# Patient Record
Sex: MALE | Race: BLACK OR AFRICAN AMERICAN
[De-identification: names, ages, dates, MRNs, and addresses within clinical notes are randomized per-mention and may not be internally consistent; named-entity substitution may affect disease eponyms.]

---

## 2019-11-09 ENCOUNTER — HOSPITAL ENCOUNTER (INPATIENT)
Dept: HOSPITAL 61 - ER | Age: 44
LOS: 4 days | Discharge: HOME | DRG: 385 | End: 2019-11-13
Attending: FAMILY MEDICINE | Admitting: FAMILY MEDICINE
Payer: SELF-PAY

## 2019-11-09 VITALS — HEIGHT: 63 IN | BODY MASS INDEX: 22.15 KG/M2 | WEIGHT: 125 LBS

## 2019-11-09 VITALS — DIASTOLIC BLOOD PRESSURE: 72 MMHG | SYSTOLIC BLOOD PRESSURE: 117 MMHG

## 2019-11-09 VITALS — DIASTOLIC BLOOD PRESSURE: 69 MMHG | SYSTOLIC BLOOD PRESSURE: 106 MMHG

## 2019-11-09 VITALS — DIASTOLIC BLOOD PRESSURE: 82 MMHG | SYSTOLIC BLOOD PRESSURE: 124 MMHG

## 2019-11-09 VITALS — SYSTOLIC BLOOD PRESSURE: 109 MMHG | DIASTOLIC BLOOD PRESSURE: 69 MMHG

## 2019-11-09 DIAGNOSIS — E43: ICD-10-CM

## 2019-11-09 DIAGNOSIS — Z82.49: ICD-10-CM

## 2019-11-09 DIAGNOSIS — D62: ICD-10-CM

## 2019-11-09 DIAGNOSIS — K51.911: Primary | ICD-10-CM

## 2019-11-09 DIAGNOSIS — Z79.899: ICD-10-CM

## 2019-11-09 LAB
ALBUMIN SERPL-MCNC: 2.3 G/DL (ref 3.4–5)
ALBUMIN/GLOB SERPL: 0.3 {RATIO} (ref 1–1.7)
ALP SERPL-CCNC: 70 U/L (ref 46–116)
ALT SERPL-CCNC: < 6 U/L (ref 16–63)
ANION GAP SERPL CALC-SCNC: 16 MMOL/L (ref 6–14)
AST SERPL-CCNC: 14 U/L (ref 15–37)
BASOPHILS # BLD AUTO: 0.1 X10^3/UL (ref 0–0.2)
BASOPHILS NFR BLD: 1 % (ref 0–3)
BILIRUB SERPL-MCNC: 0.7 MG/DL (ref 0.2–1)
BUN SERPL-MCNC: 14 MG/DL (ref 8–26)
BUN/CREAT SERPL: 18 (ref 6–20)
CALCIUM SERPL-MCNC: 8.7 MG/DL (ref 8.5–10.1)
CHLORIDE SERPL-SCNC: 95 MMOL/L (ref 98–107)
CO2 SERPL-SCNC: 24 MMOL/L (ref 21–32)
CREAT SERPL-MCNC: 0.8 MG/DL (ref 0.7–1.3)
EOSINOPHIL NFR BLD: 0 % (ref 0–3)
EOSINOPHIL NFR BLD: 0 X10^3/UL (ref 0–0.7)
ERYTHROCYTE [DISTWIDTH] IN BLOOD BY AUTOMATED COUNT: 13.6 % (ref 11.5–14.5)
FECAL OB PT: POSITIVE
GFR SERPLBLD BASED ON 1.73 SQ M-ARVRAT: 127.1 ML/MIN
GLOBULIN SER-MCNC: 7.3 G/DL (ref 2.2–3.8)
GLUCOSE SERPL-MCNC: 106 MG/DL (ref 70–99)
HCT VFR BLD CALC: 30 % (ref 39–53)
HGB BLD-MCNC: 9.7 G/DL (ref 13–17.5)
LIPASE: 111 U/L (ref 73–393)
LYMPHOCYTES # BLD: 1.4 X10^3/UL (ref 1–4.8)
LYMPHOCYTES NFR BLD AUTO: 11 % (ref 24–48)
MCH RBC QN AUTO: 27 PG (ref 25–35)
MCHC RBC AUTO-ENTMCNC: 32 G/DL (ref 31–37)
MCV RBC AUTO: 83 FL (ref 79–100)
MONO #: 1.4 X10^3/UL (ref 0–1.1)
MONOCYTES NFR BLD: 11 % (ref 0–9)
NEUT #: 10.5 X10^3/UL (ref 1.8–7.7)
NEUTROPHILS NFR BLD AUTO: 78 % (ref 31–73)
PLATELET # BLD AUTO: 508 X10^3/UL (ref 140–400)
POTASSIUM SERPL-SCNC: 3.5 MMOL/L (ref 3.5–5.1)
PROT SERPL-MCNC: 9.6 G/DL (ref 6.4–8.2)
RBC # BLD AUTO: 3.63 X10^6/UL (ref 4.3–5.7)
SODIUM SERPL-SCNC: 135 MMOL/L (ref 136–145)
WBC # BLD AUTO: 13.4 X10^3/UL (ref 4–11)

## 2019-11-09 PROCEDURE — P9016 RBC LEUKOCYTES REDUCED: HCPCS

## 2019-11-09 PROCEDURE — 85610 PROTHROMBIN TIME: CPT

## 2019-11-09 PROCEDURE — 85027 COMPLETE CBC AUTOMATED: CPT

## 2019-11-09 PROCEDURE — 86140 C-REACTIVE PROTEIN: CPT

## 2019-11-09 PROCEDURE — 82274 ASSAY TEST FOR BLOOD FECAL: CPT

## 2019-11-09 PROCEDURE — 74177 CT ABD & PELVIS W/CONTRAST: CPT

## 2019-11-09 PROCEDURE — 83690 ASSAY OF LIPASE: CPT

## 2019-11-09 PROCEDURE — 36430 TRANSFUSION BLD/BLD COMPNT: CPT

## 2019-11-09 PROCEDURE — G0378 HOSPITAL OBSERVATION PER HR: HCPCS

## 2019-11-09 PROCEDURE — 86850 RBC ANTIBODY SCREEN: CPT

## 2019-11-09 PROCEDURE — 86920 COMPATIBILITY TEST SPIN: CPT

## 2019-11-09 PROCEDURE — 83605 ASSAY OF LACTIC ACID: CPT

## 2019-11-09 PROCEDURE — 36415 COLL VENOUS BLD VENIPUNCTURE: CPT

## 2019-11-09 PROCEDURE — 96365 THER/PROPH/DIAG IV INF INIT: CPT

## 2019-11-09 PROCEDURE — 45380 COLONOSCOPY AND BIOPSY: CPT

## 2019-11-09 PROCEDURE — 87040 BLOOD CULTURE FOR BACTERIA: CPT

## 2019-11-09 PROCEDURE — 86901 BLOOD TYPING SEROLOGIC RH(D): CPT

## 2019-11-09 PROCEDURE — 87086 URINE CULTURE/COLONY COUNT: CPT

## 2019-11-09 PROCEDURE — 86900 BLOOD TYPING SEROLOGIC ABO: CPT

## 2019-11-09 PROCEDURE — 80053 COMPREHEN METABOLIC PANEL: CPT

## 2019-11-09 PROCEDURE — 87045 FECES CULTURE AEROBIC BACT: CPT

## 2019-11-09 PROCEDURE — 96361 HYDRATE IV INFUSION ADD-ON: CPT

## 2019-11-09 PROCEDURE — 88305 TISSUE EXAM BY PATHOLOGIST: CPT

## 2019-11-09 PROCEDURE — C9113 INJ PANTOPRAZOLE SODIUM, VIA: HCPCS

## 2019-11-09 PROCEDURE — 85025 COMPLETE CBC W/AUTO DIFF WBC: CPT

## 2019-11-09 RX ADMIN — CIPROFLOXACIN SCH MLS/HR: 2 INJECTION, SOLUTION INTRAVENOUS at 20:59

## 2019-11-09 RX ADMIN — BACITRACIN SCH MLS/HR: 5000 INJECTION, POWDER, FOR SOLUTION INTRAMUSCULAR at 16:43

## 2019-11-09 NOTE — RAD
CT ABD PELV W/ IV CONTRST ONLY 

 

Indication: Left-sided abdominal pain, tenesmus

 

Technique: Postcontrast CT imaging was performed of the abdomen pelvis, 

multiplanar reconstruction images submitted. One or more of the following 

individualized dose reduction techniques were utilized for this 

examination:  1. Automated exposure control  2. Adjustment of the mA 

and/or kV according to patient size  3. Use of iterative reconstruction 

technique.

 

Comparison: None

 

Findings:  

There is severe long segment colonic wall thickening greatest of the 

descending colon to the level of the rectum, to a lesser degree of the 

transverse colon and near hepatic flexure. Cecum walls do not appear 

significantly thickened. Bowel is not considered significantly dilated. No

free air is identified. There is no localized extraluminal fluid 

collection. Appendix is not clearly identified.

 

Gallbladder is present without obvious intraluminal abnormality by CT. 

Both kidneys enhance, no hydronephrosis. There is no adrenal nodularity. 

No focal abnormality is identified of the liver, pancreas, spleen. There 

is no pleural fluid.

 

IMPRESSION:

1.  There is long segment prominent colonic wall thickening, evidence of 

colitis, extent to the rectum as could be seen with sequela of ulcerative 

colitis.

 

Electronically signed by: Serg Balderas MD (11/9/2019 9:04 AM) [facility]

## 2019-11-09 NOTE — PHYS DOC
Adult General


Chief Complaint


Chief Complaint:  ABDOMINAL PAIN





HPI


HPI





Patient is a 44  year old male who presents with chief complaint of abdominal 

discomfort patient states that he feels a bubbling sensation in his abdomen he 

feels like he needs to have frequent bowel movements he goes to the bathroom he 

just has a small amount of brown stool with basically mostly mucus occasional 

blood mixed in he tells me that 10 years ago he had a history of colitis and he 

had a flareup he thinks he is having a flareup now no fever that he knows of no 

vomiting mild nausea and no past medical history does not smoke drink or use 

drugs review of systems otherwise negative except as noted in the history of 

present illness negative for chest pain shortness of breath or cough.





Review of Systems


Review of Systems





Constitutional: Denies fever or chills []


Eyes: Denies change in visual acuity, redness, or eye pain []


HENT: Denies nasal congestion or sore throat []


Respiratory: Denies cough or shortness of breath []


Cardiovascular: No additional information not addressed in HPI []





Integument: Denies rash or skin lesions []


Neurologic: Denies headache, focal weakness or sensory changes []


Endocrine: Denies polyuria or polydipsia []





All other systems were reviewed and found to be within normal limits, except as 

documented in this note.





Current Medications


Current Medications





Current Medications








 Medications


  (Trade)  Dose


 Ordered  Sig/Arely  Start Time


 Stop Time Status Last Admin


Dose Admin


 


 Ciprofloxacin/


 Dextrose  200 ml @ 


 200 mls/hr  1X  ONCE  11/9/19 09:45


 11/9/19 10:44   





 


 Info


  (CONTRAST GIVEN


 -- Rx MONITORING)  1 each  PRN DAILY  PRN  11/9/19 08:45


 11/11/19 08:44   





 


 Iohexol


  (Omnipaque 300


 Mg/ml)  75 ml  1X  ONCE  11/9/19 08:45


 11/9/19 08:46 DC 11/9/19 08:59


75 ML


 


 Metronidazole  100 ml @ 


 100 mls/hr  1X  ONCE  11/9/19 09:45


 11/9/19 10:44   





 


 Morphine Sulfate


  (Morphine


 Sulfate)  2 mg  PRN Q2HR  PRN  11/9/19 09:45


 11/10/19 09:44   





 


 Ondansetron HCl


  (Zofran)  4 mg  PRN Q8HRS  PRN  11/9/19 09:45


 11/10/19 09:44   





 


 Pantoprazole


 Sodium


  (PROTONIX VIAL


 for IV PUSH)  40 mg  1X  ONCE  11/9/19 09:45


 11/9/19 09:46 DC  





 


 Sodium Chloride  1,000 ml @ 


 75 mls/hr  [id number]  11/9/19 09:31


 11/10/19 09:30   














Allergies


Allergies





Allergies








Coded Allergies Type Severity Reaction Last Updated Verified


 


  No Known Drug Allergies    11/9/19 No











Physical Exam


Physical Exam





Constitutional: Well developed, well nourished, no acute distress, non-toxic 

appearance. []


HENT: Normocephalic, atraumatic, bilateral external ears normal, oropharynx 

moist, no oral exudates, nose normal. []


Eyes: PERRLA, EOMI, conjunctiva normal, no discharge. [] 


Neck: Normal range of motion, no tenderness, supple, no stridor. [] 


Pulmonary: Normal respiratory effort no increased work of breathing no obvious 

chest wall trauma


Abdomen: Bowel sounds normal, soft, MILD LLQ tenderness, no masses, no pulsatile

 masses. [] 


RECTAL EXAM TTP NOTED MELENA NOTED


Skin: CHRONIC SKIN CHANGES NTOED


Back: No tenderness, no CVA tenderness. [] 


Extremities: No tenderness, no cyanosis, no clubbing, ROM intact, no edema. [] 


Neurologic: Alert and oriented X 3, normal motor function, normal sensory 

function, no focal deficits noted. []


Psychologic: Affect normal, judgement normal, mood normal. []





Current Patient Data


Vital Signs





                                   Vital Signs








  Date Time  Temp Pulse Resp B/P (MAP) Pulse Ox O2 Delivery O2 Flow Rate FiO2


 


11/9/19 07:44 98.8 116 18 135/73 (93) 100 Room Air  





 98.8       








Lab Values





                                Laboratory Tests








Test


 11/9/19


07:52 11/9/19


09:00


 


White Blood Count


 13.4 x10^3/uL


(4.0-11.0)  H 





 


Red Blood Count


 3.63 x10^6/uL


(4.30-5.70)  L 





 


Hemoglobin


 9.7 g/dL


(13.0-17.5)  L 





 


Hematocrit


 30.0 %


(39.0-53.0)  L 





 


Mean Corpuscular Volume


 83 fL ()


 





 


Mean Corpuscular Hemoglobin 27 pg (25-35)   


 


Mean Corpuscular Hemoglobin


Concent 32 g/dL


(31-37) 





 


Red Cell Distribution Width


 13.6 %


(11.5-14.5) 





 


Platelet Count


 508 x10^3/uL


(140-400)  H 





 


Neutrophils (%) (Auto) 78 % (31-73)  H 


 


Lymphocytes (%) (Auto) 11 % (24-48)  L 


 


Monocytes (%) (Auto) 11 % (0-9)  H 


 


Eosinophils (%) (Auto) 0 % (0-3)   


 


Basophils (%) (Auto) 1 % (0-3)   


 


Neutrophils # (Auto)


 10.5 x10^3/uL


(1.8-7.7)  H 





 


Lymphocytes # (Auto)


 1.4 x10^3/uL


(1.0-4.8) 





 


Monocytes # (Auto)


 1.4 x10^3/uL


(0.0-1.1)  H 





 


Eosinophils # (Auto)


 0.0 x10^3/uL


(0.0-0.7) 





 


Basophils # (Auto)


 0.1 x10^3/uL


(0.0-0.2) 





 


Sodium Level


 135 mmol/L


(136-145)  L 





 


Potassium Level


 3.5 mmol/L


(3.5-5.1) 





 


Chloride Level


 95 mmol/L


()  L 





 


Carbon Dioxide Level


 24 mmol/L


(21-32) 





 


Anion Gap 16 (6-14)  H 


 


Blood Urea Nitrogen


 14 mg/dL


(8-26) 





 


Creatinine


 0.8 mg/dL


(0.7-1.3) 





 


Estimated GFR


(Cockcroft-Gault) 127.1  


 





 


BUN/Creatinine Ratio 18 (6-20)   


 


Glucose Level


 106 mg/dL


(70-99)  H 





 


Calcium Level


 8.7 mg/dL


(8.5-10.1) 





 


Total Bilirubin


 0.7 mg/dL


(0.2-1.0) 





 


Aspartate Amino Transferase


(AST) 14 U/L (15-37)


L 





 


Alanine Aminotransferase (ALT)


 < 6 U/L


(16-63)  L 





 


Alkaline Phosphatase


 70 U/L


() 





 


Total Protein


 9.6 g/dL


(6.4-8.2)  H 





 


Albumin


 2.3 g/dL


(3.4-5.0)  L 





 


Albumin/Globulin Ratio


 0.3 (1.0-1.7)


L 





 


Lipase


 111 U/L


() 





 


Stool Occult Blood


 


 Positive (NEG)








                                Laboratory Tests


11/9/19 07:52








                                Laboratory Tests


11/9/19 07:52











EKG


EKG


[]





Radiology/Procedures


Radiology/Procedures


[]


Impressions:


 


IMPRESSION:


1.  There is long segment prominent colonic wall thickening, evidence of 


colitis, extent to the rectum as could be seen with sequela of ulcerative 


colitis.


 


Electronically signed by: Serg Balderas MD (11/9/2019 9:04 AM) [facility]














Course & Med Decision Making


Course & Med Decision Making


Pertinent Labs and Imaging studies reviewed. (See chart for details)





[]COLITIS


WBC 13


HB 9 NO OLD


SUSPECT UNDIAGNOSED COLITIS





CIPRO/FLAGYL


LACTIC PENDING





D/W FULBRIGHT





HB 9 HD STABLE MILD TACHYCARDIA, DID TYPE AND SCREEN, HYDRATED IN ER, PROTONIX 

BOLUS





Dragon Disclaimer


Dragon Disclaimer


This electronic medical record was generated, in whole or in part, using a voice

 recognition dictation system.





Departure


Departure


Impression:  


   Primary Impression:  


   Colitis


Disposition:  09 ADMITTED AS INPATIENT


Admitting Physician:  HIMS


Condition:  STABLE











DORCAS AVILES MD             Nov 9, 2019 07:40

## 2019-11-09 NOTE — PDOC1
History and Physical


Date of Admission


Date of Admission


DATE: 19 


TIME: 09:27





Identification/Chief Complaint


Chief Complaint


SEEN IN ER , 44  year old male who presents with chief complaint of abdominal 

discomfort patient states that he feels a bubbling sensation in his abdomen he 

feels like he needs to have frequent bowel movements he goes to the bathroom he 

just has a small amount of brown stool with mucus occasional blood mixed in 





10 years ago he had a history of colitis 





Denies prior colonoscopy or sigmoidoscopy.





Past Medical History


Cardiovascular:  No pertinent hx


GI:  Inflam bowel disease


Infectious disease:  No pertinent hx





Family History


Family History:  Hypertension





Social History


Smoke:  No


ALCOHOL:  none


Drugs:  None





Current Medications


Current Medications





Current Medications


Sodium Chloride 1,000 ml @  1,000 mls/hr 1X  ONCE IV  Last administered on 

19at 07:53;  Start 19 at 07:45;  Stop 19 at 08:44;  Status DC


Iohexol (Omnipaque 300 Mg/ml) 75 ml 1X  ONCE IV  Last administered on 19at 

08:59;  Start 19 at 08:45;  Stop 19 at 08:46;  Status DC


Info (CONTRAST GIVEN -- Rx MONITORING) 1 each PRN DAILY  PRN MC SEE COMMENTS;  

Start 19 at 08:45;  Stop 19 at 08:44





Allergies


Allergies:  


Coded Allergies:  


     No Known Drug Allergies (Unverified , 19)





ROS


Review of System





Review of Systems


Review of Systems





Constitutional: POS  fever // chills []


Eyes: Denies change in visual acuity, redness, or eye pain []


HENT: Denies nasal congestion or sore throat []


Respiratory: Denies cough or shortness of breath []


Cardiovascular: No additional information not addressed in HPI []


Integument: Denies rash or skin lesions []


Neurologic: Denies headache, focal weakness or sensory changes []


Endocrine: Denies polyuria or polydipsia []





14 PT  systems were reviewed and found to be within normal limits, except as 

documented





Physical Exam


Physical Exam





Physical Exam


Physical Exam





Constitutional: Well developed, well nourished, no acute distress, non-toxic 

appearance. []


HENT: Normocephalic, atraumatic, bilateral external ears normal, oropharynx 

moist, no oral exudates, nose normal. []


Eyes: PERRLA, EOMI, conjunctiva normal, no discharge. [] 


Neck: Normal range of motion, no tenderness, supple, no stridor. [] 


Pulmonary: Normal respiratory effort no increased work of breathing no obvious 

chest wall trauma


Abdomen: Bowel sounds normal, soft, MILD LLQ tenderness, no masses, no pulsatile

 masses. [] 


RECTAL EXAM TTP NOTED MELENA NOTED BY ER PHYSICIAN 


Skin: CHRONIC SKIN CHANGES NTOED


Back: No tenderness, no CVA tenderness. [] 


Extremities: No tenderness, no cyanosis, no clubbing, ROM intact, no edema. [] 


Neurologic: Alert and oriented X 3, normal motor function, normal sensory 

function, no focal deficits noted. []


Psychologic: Affect normal, judgement normal, mood normal. []


General:  Alert, Oriented X3, Cooperative, No acute distress


HEENT:  Atraumatic, EOMI, Mucous membr. moist/pink


Lungs:  Clear to auscultation, Normal air movement


Heart:  RRR, no thrills


Abdomen:  Soft


Extremities:  No cyanosis


Neuro:  Normal speech, Sensation intact, Cranial nerves 3-12 NL


Psych/Mental Status:  Mental status NL, Mood NL





Vitals


Vitals





Vital Signs








  Date Time  Temp Pulse Resp B/P (MAP) Pulse Ox O2 Delivery O2 Flow Rate FiO2


 


19 07:44 98.8 116 18 135/73 (93) 100 Room Air  





 98.8       











Labs


Labs





Laboratory Tests








Test


 19


07:52 19


09:00


 


White Blood Count


 13.4 x10^3/uL


(4.0-11.0) 





 


Red Blood Count


 3.63 x10^6/uL


(4.30-5.70) 





 


Hemoglobin


 9.7 g/dL


(13.0-17.5) 





 


Hematocrit


 30.0 %


(39.0-53.0) 





 


Mean Corpuscular Volume 83 fL ()  


 


Mean Corpuscular Hemoglobin 27 pg (25-35)  


 


Mean Corpuscular Hemoglobin


Concent 32 g/dL


(31-37) 





 


Red Cell Distribution Width


 13.6 %


(11.5-14.5) 





 


Platelet Count


 508 x10^3/uL


(140-400) 





 


Neutrophils (%) (Auto) 78 % (31-73)  


 


Lymphocytes (%) (Auto) 11 % (24-48)  


 


Monocytes (%) (Auto) 11 % (0-9)  


 


Eosinophils (%) (Auto) 0 % (0-3)  


 


Basophils (%) (Auto) 1 % (0-3)  


 


Neutrophils # (Auto)


 10.5 x10^3/uL


(1.8-7.7) 





 


Lymphocytes # (Auto)


 1.4 x10^3/uL


(1.0-4.8) 





 


Monocytes # (Auto)


 1.4 x10^3/uL


(0.0-1.1) 





 


Eosinophils # (Auto)


 0.0 x10^3/uL


(0.0-0.7) 





 


Basophils # (Auto)


 0.1 x10^3/uL


(0.0-0.2) 





 


Sodium Level


 135 mmol/L


(136-145) 





 


Potassium Level


 3.5 mmol/L


(3.5-5.1) 





 


Chloride Level


 95 mmol/L


() 





 


Carbon Dioxide Level


 24 mmol/L


(21-32) 





 


Anion Gap 16 (6-14)  


 


Blood Urea Nitrogen


 14 mg/dL


(8-26) 





 


Creatinine


 0.8 mg/dL


(0.7-1.3) 





 


Estimated GFR


(Cockcroft-Gault) 127.1 


 





 


BUN/Creatinine Ratio 18 (6-20)  


 


Glucose Level


 106 mg/dL


(70-99) 





 


Calcium Level


 8.7 mg/dL


(8.5-10.1) 





 


Total Bilirubin


 0.7 mg/dL


(0.2-1.0) 





 


Aspartate Amino Transf


(AST/SGOT) 14 U/L (15-37) 


 





 


Alanine Aminotransferase


(ALT/SGPT) < 6 U/L


(16-63) 





 


Alkaline Phosphatase


 70 U/L


() 





 


Total Protein


 9.6 g/dL


(6.4-8.2) 





 


Albumin


 2.3 g/dL


(3.4-5.0) 





 


Albumin/Globulin Ratio 0.3 (1.0-1.7)  


 


Lipase


 111 U/L


() 





 


Stool Occult Blood  Positive (NEG) 








Laboratory Tests








Test


 19


07:52 19


09:00


 


White Blood Count


 13.4 x10^3/uL


(4.0-11.0) 





 


Red Blood Count


 3.63 x10^6/uL


(4.30-5.70) 





 


Hemoglobin


 9.7 g/dL


(13.0-17.5) 





 


Hematocrit


 30.0 %


(39.0-53.0) 





 


Mean Corpuscular Volume 83 fL ()  


 


Mean Corpuscular Hemoglobin 27 pg (25-35)  


 


Mean Corpuscular Hemoglobin


Concent 32 g/dL


(31-37) 





 


Red Cell Distribution Width


 13.6 %


(11.5-14.5) 





 


Platelet Count


 508 x10^3/uL


(140-400) 





 


Neutrophils (%) (Auto) 78 % (31-73)  


 


Lymphocytes (%) (Auto) 11 % (24-48)  


 


Monocytes (%) (Auto) 11 % (0-9)  


 


Eosinophils (%) (Auto) 0 % (0-3)  


 


Basophils (%) (Auto) 1 % (0-3)  


 


Neutrophils # (Auto)


 10.5 x10^3/uL


(1.8-7.7) 





 


Lymphocytes # (Auto)


 1.4 x10^3/uL


(1.0-4.8) 





 


Monocytes # (Auto)


 1.4 x10^3/uL


(0.0-1.1) 





 


Eosinophils # (Auto)


 0.0 x10^3/uL


(0.0-0.7) 





 


Basophils # (Auto)


 0.1 x10^3/uL


(0.0-0.2) 





 


Sodium Level


 135 mmol/L


(136-145) 





 


Potassium Level


 3.5 mmol/L


(3.5-5.1) 





 


Chloride Level


 95 mmol/L


() 





 


Carbon Dioxide Level


 24 mmol/L


(21-32) 





 


Anion Gap 16 (6-14)  


 


Blood Urea Nitrogen


 14 mg/dL


(8-26) 





 


Creatinine


 0.8 mg/dL


(0.7-1.3) 





 


Estimated GFR


(Cockcroft-Gault) 127.1 


 





 


BUN/Creatinine Ratio 18 (6-20)  


 


Glucose Level


 106 mg/dL


(70-99) 





 


Calcium Level


 8.7 mg/dL


(8.5-10.1) 





 


Total Bilirubin


 0.7 mg/dL


(0.2-1.0) 





 


Aspartate Amino Transf


(AST/SGOT) 14 U/L (15-37) 


 





 


Alanine Aminotransferase


(ALT/SGPT) < 6 U/L


(16-63) 





 


Alkaline Phosphatase


 70 U/L


() 





 


Total Protein


 9.6 g/dL


(6.4-8.2) 





 


Albumin


 2.3 g/dL


(3.4-5.0) 





 


Albumin/Globulin Ratio 0.3 (1.0-1.7)  


 


Lipase


 111 U/L


() 





 


Stool Occult Blood  Positive (NEG) 











Images


Images





PATIENT: JONAS PULLIAM   ACCOUNT: [id number]     MRN#: [id number]


: 1975           LOCATION: ER              AGE: 44


SEX: M                    EXAM DT: 19         ACCESSION#: [id number]


STATUS: REG ER            ORD. PHYSICIAN: DORCAS AVILES MD


REASON: left side abdo pain, tenesmus, eval for diverticulitis.


PROCEDURE: CT ABD PELV W/ IV CONTRST ONLY





CT ABD PELV W/ IV CONTRST ONLY 


 


Indication: Left-sided abdominal pain, tenesmus


 


Technique: Postcontrast CT imaging was performed of the abdomen pelvis, 


multiplanar reconstruction images submitted. One or more of the following 


individualized dose reduction techniques were utilized for this 


examination:  1. Automated exposure control  2. Adjustment of the mA 


and/or kV according to patient size  3. Use of iterative reconstruction 


technique.


 


Comparison: None


 


Findings:  


There is severe long segment colonic wall thickening greatest of the 


descending colon to the level of the rectum, to a lesser degree of the 


transverse colon and near hepatic flexure. Cecum walls do not appear 


significantly thickened. Bowel is not considered significantly dilated. No


free air is identified. There is no localized extraluminal fluid 


collection. Appendix is not clearly identified.


 


Gallbladder is present without obvious intraluminal abnormality by CT. 


Both kidneys enhance, no hydronephrosis. There is no adrenal nodularity. 


No focal abnormality is identified of the liver, pancreas, spleen. There 


is no pleural fluid.


 


IMPRESSION:


1.  There is long segment prominent colonic wall thickening, evidence of 


colitis, extent to the rectum as could be seen with sequela of ulcerative 


colitis.


 


Electronically signed by: Serg Balderas MD (2019 9:04 AM) [facility]





VTE Prophylaxis Ordered


VTE Prophylaxis Devices:  No


VTE Pharmacological Prophylaxi:  Contraindicated





Assessment/Plan


Assessment/Plan


 


IMPRESSION:








1.  ON CT  ,  long segment prominent colonic wall thickening, evidence of ACUTE 

colitis, extent to the rectum as could be seen with sequela of ulcerative 

colitis.


2. ANEMIA











PLAN








ADMIT


NPO


GI CONSULT


IV FLAGYL


STOOL CULTURE


SCD'S


PO STEROID TAPER 














61 MIN PT EXAM, CHART REVIEW, > 50% OF TIME SPENT WITH EXAM, CHART REVIEW, PT 

CARE COORDINATION











GOVIND YIN MD           2019 09:28

## 2019-11-09 NOTE — PDOC2
CONSULT


Date of Consult


Date of Consult


DATE: 11/9/19 


TIME: 15:16





Reason for Consult


Reason for Consult:


colitis





History of Present Illness


Reason for Visit:


This is a 44-year-old male who relates a history 10 years ago of "colitis" was 

treated conservatively at that time. Diagnostic test he describes at that time 

was a CT scan.  He had a change in bowel pattern and some mucousy bleeding then 

which resolved without chronic treatment. Last 10 years he's had a occasional 

"flares" with some mild bubbling and change in bowel pattern but no regular pain

or diarrhea symptoms.


With the past week he has noticed some loosening of his bowel movements with i

ncreased mucus and occasional blood. He describes a bubbling in his abdomen but 

not significant crampy abdominal pain. He denies fever or chills, nausea or 

vomiting.  Does have a several week history of arthralgias his hands and feet 

and some other joints as well. He has been taking ibuprofen about 600 mg 

arthralgias symptoms





He denies prior colonoscopy or sigmoidoscopy.





Past Medical History


GI:  Other (possible colitis 10 years ago)


Rheumatologic:  Other (arthralgias without clear diagnosis)





Current Problem List


Problem List


Problems


Medical Problems:


(1) Colitis


Status: Acute  











Current Medications


Current Medications





Current Medications


Sodium Chloride 1,000 ml @  1,000 mls/hr 1X  ONCE IV  Last administered on 

11/9/19at 07:53;  Start 11/9/19 at 07:45;  Stop 11/9/19 at 08:44;  Status DC


Iohexol (Omnipaque 300 Mg/ml) 75 ml 1X  ONCE IV  Last administered on 11/9/19at 

08:59;  Start 11/9/19 at 08:45;  Stop 11/9/19 at 08:46;  Status DC


Info (CONTRAST GIVEN -- Rx MONITORING) 1 each PRN DAILY  PRN MC SEE COMMENTS;  

Start 11/9/19 at 08:45;  Stop 11/11/19 at 08:44


Ondansetron HCl (Zofran) 4 mg PRN Q8HRS  PRN IV NAUSEA/VOMITING;  Start 11/9/19 

at 09:45;  Stop 11/10/19 at 09:44


Morphine Sulfate (Morphine Sulfate) 2 mg PRN Q2HR  PRN IV PAIN;  Start 11/9/19 

at 09:45;  Stop 11/10/19 at 09:44


Sodium Chloride 1,000 ml @  75 mls/hr [id number] IV  Last administered on 11/9/19at

11:26;  Start 11/9/19 at 09:31;  Stop 11/10/19 at 09:30


Ciprofloxacin/ Dextrose 200 ml @  200 mls/hr 1X  ONCE IV  Last administered on 

11/9/19at 10:18;  Start 11/9/19 at 09:45;  Stop 11/9/19 at 10:44;  Status DC


Metronidazole 100 ml @  100 mls/hr 1X  ONCE IV  Last administered on 11/9/19at 

12:00;  Start 11/9/19 at 09:45;  Stop 11/9/19 at 10:44;  Status DC


Pantoprazole Sodium (PROTONIX VIAL for IV PUSH) 40 mg 1X  ONCE IVP  Last 

administered on 11/9/19at 11:27;  Start 11/9/19 at 09:45;  Stop 11/9/19 at 

09:46;  Status DC


Sodium Chloride 500 ml @  500 mls/hr 1X  ONCE IV  Last administered on 11/9/19at

10:00;  Start 11/9/19 at 10:00;  Stop 11/9/19 at 10:59;  Status DC





Allergies


Allergies:  


Coded Allergies:  


     No Known Drug Allergies (Unverified , 11/9/19)





Physical Exam


General:  Alert, Oriented X3, Cooperative


HEENT:  Atraumatic, PERRLA


Lungs:  Clear to auscultation


Heart:  Regular rate, Normal S1, Normal S2


Abdomen:  Normal bowel sounds, Soft, No tenderness, No hepatosplenomegaly, No 

masses


Skin:  No rashes, No breakdown


Neuro:  Normal speech


Psych/Mental Status:  Mental status NL





Vitals


VITALS





Vital Signs








  Date Time  Temp Pulse Resp B/P (MAP) Pulse Ox O2 Delivery O2 Flow Rate FiO2


 


11/9/19 11:00 98.9 105 18 124/82 (96) 98 Room Air  





 98.9       











Labs


Labs





Laboratory Tests








Test


 11/9/19


07:52 11/9/19


09:00 11/9/19


09:50


 


White Blood Count


 13.4 x10^3/uL


(4.0-11.0) 


 





 


Red Blood Count


 3.63 x10^6/uL


(4.30-5.70) 


 





 


Hemoglobin


 9.7 g/dL


(13.0-17.5) 


 





 


Hematocrit


 30.0 %


(39.0-53.0) 


 





 


Mean Corpuscular Volume 83 fL ()   


 


Mean Corpuscular Hemoglobin 27 pg (25-35)   


 


Mean Corpuscular Hemoglobin


Concent 32 g/dL


(31-37) 


 





 


Red Cell Distribution Width


 13.6 %


(11.5-14.5) 


 





 


Platelet Count


 508 x10^3/uL


(140-400) 


 





 


Neutrophils (%) (Auto) 78 % (31-73)   


 


Lymphocytes (%) (Auto) 11 % (24-48)   


 


Monocytes (%) (Auto) 11 % (0-9)   


 


Eosinophils (%) (Auto) 0 % (0-3)   


 


Basophils (%) (Auto) 1 % (0-3)   


 


Neutrophils # (Auto)


 10.5 x10^3/uL


(1.8-7.7) 


 





 


Lymphocytes # (Auto)


 1.4 x10^3/uL


(1.0-4.8) 


 





 


Monocytes # (Auto)


 1.4 x10^3/uL


(0.0-1.1) 


 





 


Eosinophils # (Auto)


 0.0 x10^3/uL


(0.0-0.7) 


 





 


Basophils # (Auto)


 0.1 x10^3/uL


(0.0-0.2) 


 





 


Sodium Level


 135 mmol/L


(136-145) 


 





 


Potassium Level


 3.5 mmol/L


(3.5-5.1) 


 





 


Chloride Level


 95 mmol/L


() 


 





 


Carbon Dioxide Level


 24 mmol/L


(21-32) 


 





 


Anion Gap 16 (6-14)   


 


Blood Urea Nitrogen


 14 mg/dL


(8-26) 


 





 


Creatinine


 0.8 mg/dL


(0.7-1.3) 


 





 


Estimated GFR


(Cockcroft-Gault) 127.1 


 


 





 


BUN/Creatinine Ratio 18 (6-20)   


 


Glucose Level


 106 mg/dL


(70-99) 


 





 


Calcium Level


 8.7 mg/dL


(8.5-10.1) 


 





 


Total Bilirubin


 0.7 mg/dL


(0.2-1.0) 


 





 


Aspartate Amino Transf


(AST/SGOT) 14 U/L (15-37) 


 


 





 


Alanine Aminotransferase


(ALT/SGPT) < 6 U/L


(16-63) 


 





 


Alkaline Phosphatase


 70 U/L


() 


 





 


Total Protein


 9.6 g/dL


(6.4-8.2) 


 





 


Albumin


 2.3 g/dL


(3.4-5.0) 


 





 


Albumin/Globulin Ratio 0.3 (1.0-1.7)   


 


Lipase


 111 U/L


() 


 





 


Stool Occult Blood  Positive (NEG)  


 


Lactic Acid Level


 


 


 1.4 mmol/L


(0.4-2.0)








Laboratory Tests








Test


 11/9/19


07:52 11/9/19


09:00 11/9/19


09:50


 


White Blood Count


 13.4 x10^3/uL


(4.0-11.0) 


 





 


Red Blood Count


 3.63 x10^6/uL


(4.30-5.70) 


 





 


Hemoglobin


 9.7 g/dL


(13.0-17.5) 


 





 


Hematocrit


 30.0 %


(39.0-53.0) 


 





 


Mean Corpuscular Volume 83 fL ()   


 


Mean Corpuscular Hemoglobin 27 pg (25-35)   


 


Mean Corpuscular Hemoglobin


Concent 32 g/dL


(31-37) 


 





 


Red Cell Distribution Width


 13.6 %


(11.5-14.5) 


 





 


Platelet Count


 508 x10^3/uL


(140-400) 


 





 


Neutrophils (%) (Auto) 78 % (31-73)   


 


Lymphocytes (%) (Auto) 11 % (24-48)   


 


Monocytes (%) (Auto) 11 % (0-9)   


 


Eosinophils (%) (Auto) 0 % (0-3)   


 


Basophils (%) (Auto) 1 % (0-3)   


 


Neutrophils # (Auto)


 10.5 x10^3/uL


(1.8-7.7) 


 





 


Lymphocytes # (Auto)


 1.4 x10^3/uL


(1.0-4.8) 


 





 


Monocytes # (Auto)


 1.4 x10^3/uL


(0.0-1.1) 


 





 


Eosinophils # (Auto)


 0.0 x10^3/uL


(0.0-0.7) 


 





 


Basophils # (Auto)


 0.1 x10^3/uL


(0.0-0.2) 


 





 


Sodium Level


 135 mmol/L


(136-145) 


 





 


Potassium Level


 3.5 mmol/L


(3.5-5.1) 


 





 


Chloride Level


 95 mmol/L


() 


 





 


Carbon Dioxide Level


 24 mmol/L


(21-32) 


 





 


Anion Gap 16 (6-14)   


 


Blood Urea Nitrogen


 14 mg/dL


(8-26) 


 





 


Creatinine


 0.8 mg/dL


(0.7-1.3) 


 





 


Estimated GFR


(Cockcroft-Gault) 127.1 


 


 





 


BUN/Creatinine Ratio 18 (6-20)   


 


Glucose Level


 106 mg/dL


(70-99) 


 





 


Calcium Level


 8.7 mg/dL


(8.5-10.1) 


 





 


Total Bilirubin


 0.7 mg/dL


(0.2-1.0) 


 





 


Aspartate Amino Transf


(AST/SGOT) 14 U/L (15-37) 


 


 





 


Alanine Aminotransferase


(ALT/SGPT) < 6 U/L


(16-63) 


 





 


Alkaline Phosphatase


 70 U/L


() 


 





 


Total Protein


 9.6 g/dL


(6.4-8.2) 


 





 


Albumin


 2.3 g/dL


(3.4-5.0) 


 





 


Albumin/Globulin Ratio 0.3 (1.0-1.7)   


 


Lipase


 111 U/L


() 


 





 


Stool Occult Blood  Positive (NEG)  


 


Lactic Acid Level


 


 


 1.4 mmol/L


(0.4-2.0)











Images


Images


CT scan of the abdomen revealing inflammatory changes consistent with colitis 

from the descending colon to the rectum





Assessment/Plan


Assessment/Plan


1 week history of change in bowel pattern, with loose stools, some urgency, 

increased mucus and some bleeding. The scan reveals inflammatory change in the 

descending sigmoid and rectal tissues suggestive of ulcerative colitis.  We 

can't rule out an infectious cause but the absence of severe diarrhea or fevers 

does speak against that.  the history of  10 years ago of "colitis" is 

suggestive of a more chronic problem but his symptoms resolved spontaneously and

we don't have any clear diagnosis of colitis endoscopically





Recent arthralgias:  the symptoms are in unusual places for osteoarthritis-in 

his case his hands and feet  are suggestive more of a possible inflammatory 

arthritis?





Plan: Stool cultures just to confirm infection is present


         Empiric Flagyl therapy- cliquid diet


         Trial of steroids with taper


         Optimally a sigmoidoscopy would help to determine the diagnosis-  Dr. Baird will assume care on Monday and will determine testing at that time vs 

outpt work up











HOLDEN SHEPPARD MD                Nov 9, 2019 15:26

## 2019-11-10 VITALS — SYSTOLIC BLOOD PRESSURE: 110 MMHG | DIASTOLIC BLOOD PRESSURE: 72 MMHG

## 2019-11-10 VITALS — SYSTOLIC BLOOD PRESSURE: 114 MMHG | DIASTOLIC BLOOD PRESSURE: 78 MMHG

## 2019-11-10 VITALS — DIASTOLIC BLOOD PRESSURE: 70 MMHG | SYSTOLIC BLOOD PRESSURE: 108 MMHG

## 2019-11-10 VITALS — DIASTOLIC BLOOD PRESSURE: 72 MMHG | SYSTOLIC BLOOD PRESSURE: 103 MMHG

## 2019-11-10 VITALS — SYSTOLIC BLOOD PRESSURE: 131 MMHG | DIASTOLIC BLOOD PRESSURE: 72 MMHG

## 2019-11-10 LAB
ALBUMIN SERPL-MCNC: 1.6 G/DL (ref 3.4–5)
ALBUMIN/GLOB SERPL: 0.3 {RATIO} (ref 1–1.7)
ALP SERPL-CCNC: 48 U/L (ref 46–116)
ALT SERPL-CCNC: < 6 U/L (ref 16–63)
ANION GAP SERPL CALC-SCNC: 9 MMOL/L (ref 6–14)
AST SERPL-CCNC: 7 U/L (ref 15–37)
BASOPHILS # BLD AUTO: 0 X10^3/UL (ref 0–0.2)
BASOPHILS NFR BLD: 0 % (ref 0–3)
BILIRUB SERPL-MCNC: 0.5 MG/DL (ref 0.2–1)
BUN SERPL-MCNC: 7 MG/DL (ref 8–26)
BUN/CREAT SERPL: 10 (ref 6–20)
CALCIUM SERPL-MCNC: 7.7 MG/DL (ref 8.5–10.1)
CHLORIDE SERPL-SCNC: 104 MMOL/L (ref 98–107)
CO2 SERPL-SCNC: 26 MMOL/L (ref 21–32)
CREAT SERPL-MCNC: 0.7 MG/DL (ref 0.7–1.3)
EOSINOPHIL NFR BLD: 0 % (ref 0–3)
EOSINOPHIL NFR BLD: 0 X10^3/UL (ref 0–0.7)
ERYTHROCYTE [DISTWIDTH] IN BLOOD BY AUTOMATED COUNT: 13.6 % (ref 11.5–14.5)
GFR SERPLBLD BASED ON 1.73 SQ M-ARVRAT: 148.2 ML/MIN
GLOBULIN SER-MCNC: 5.5 G/DL (ref 2.2–3.8)
GLUCOSE SERPL-MCNC: 118 MG/DL (ref 70–99)
HCT VFR BLD CALC: 23.7 % (ref 39–53)
HGB BLD-MCNC: 7.6 G/DL (ref 13–17.5)
LYMPHOCYTES # BLD: 0.9 X10^3/UL (ref 1–4.8)
LYMPHOCYTES NFR BLD AUTO: 8 % (ref 24–48)
MCH RBC QN AUTO: 27 PG (ref 25–35)
MCHC RBC AUTO-ENTMCNC: 32 G/DL (ref 31–37)
MCV RBC AUTO: 83 FL (ref 79–100)
MONO #: 1 X10^3/UL (ref 0–1.1)
MONOCYTES NFR BLD: 9 % (ref 0–9)
NEUT #: 8.3 X10^3/UL (ref 1.8–7.7)
NEUTROPHILS NFR BLD AUTO: 82 % (ref 31–73)
PLATELET # BLD AUTO: 355 X10^3/UL (ref 140–400)
POTASSIUM SERPL-SCNC: 3.5 MMOL/L (ref 3.5–5.1)
PROT SERPL-MCNC: 7.1 G/DL (ref 6.4–8.2)
RBC # BLD AUTO: 2.84 X10^6/UL (ref 4.3–5.7)
SODIUM SERPL-SCNC: 139 MMOL/L (ref 136–145)
WBC # BLD AUTO: 10.1 X10^3/UL (ref 4–11)

## 2019-11-10 PROCEDURE — 30233N1 TRANSFUSION OF NONAUTOLOGOUS RED BLOOD CELLS INTO PERIPHERAL VEIN, PERCUTANEOUS APPROACH: ICD-10-PCS | Performed by: FAMILY MEDICINE

## 2019-11-10 RX ADMIN — CIPROFLOXACIN SCH MLS/HR: 2 INJECTION, SOLUTION INTRAVENOUS at 20:36

## 2019-11-10 RX ADMIN — CIPROFLOXACIN SCH MLS/HR: 2 INJECTION, SOLUTION INTRAVENOUS at 09:00

## 2019-11-10 RX ADMIN — BACITRACIN SCH MLS/HR: 5000 INJECTION, POWDER, FOR SOLUTION INTRAMUSCULAR at 02:22

## 2019-11-10 RX ADMIN — Medication SCH CAP: at 22:11

## 2019-11-10 RX ADMIN — BACITRACIN SCH MLS/HR: 5000 INJECTION, POWDER, FOR SOLUTION INTRAMUSCULAR at 12:43

## 2019-11-10 RX ADMIN — BACITRACIN SCH MLS/HR: 5000 INJECTION, POWDER, FOR SOLUTION INTRAMUSCULAR at 22:43

## 2019-11-10 NOTE — PDOC
GI PROGRESS NOTES


Date


Date/Time


DATE: 11/10/19 


TIME: 12:08





Subjective


Subjective


Stable, tolerating clear liquid diet. Still with some abdominal discomfort and 

some mucus in his loose stools.


CRP level was very elevated at greater than 130 supportive of colitis





Objective


Vitals





Vital Signs








  Date Time  Temp Pulse Resp B/P (MAP) Pulse Ox O2 Delivery O2 Flow Rate FiO2


 


11/10/19 07:00 98.7 94 17 110/72 (85) 97 Room Air  





 98.7       


 


11/10/19 03:28 99.0 92 16 131/72 (91) 97   





 99.0       


 


11/9/19 23:00 100.0 99 18 106/69 (81) 99   





 100.0       


 


11/9/19 19:00 99.7 112 20 117/72 (87) 97   





 99.7       


 


11/9/19 15:00 101.0 115 16 109/69 (82) 97 Room Air  





 101.0       


 


11/9/19 14:30      Room Air  











Labs


Labs





Laboratory Tests








Test


 11/10/19


06:45


 


White Blood Count


 10.1 x10^3/uL


(4.0-11.0)


 


Red Blood Count


 2.84 x10^6/uL


(4.30-5.70)


 


Hemoglobin


 7.6 g/dL


(13.0-17.5)


 


Hematocrit


 23.7 %


(39.0-53.0)


 


Mean Corpuscular Volume 83 fL () 


 


Mean Corpuscular Hemoglobin 27 pg (25-35) 


 


Mean Corpuscular Hemoglobin


Concent 32 g/dL


(31-37)


 


Red Cell Distribution Width


 13.6 %


(11.5-14.5)


 


Platelet Count


 355 x10^3/uL


(140-400)


 


Neutrophils (%) (Auto) 82 % (31-73) 


 


Lymphocytes (%) (Auto) 8 % (24-48) 


 


Monocytes (%) (Auto) 9 % (0-9) 


 


Eosinophils (%) (Auto) 0 % (0-3) 


 


Basophils (%) (Auto) 0 % (0-3) 


 


Neutrophils # (Auto)


 8.3 x10^3/uL


(1.8-7.7)


 


Lymphocytes # (Auto)


 0.9 x10^3/uL


(1.0-4.8)


 


Monocytes # (Auto)


 1.0 x10^3/uL


(0.0-1.1)


 


Eosinophils # (Auto)


 0.0 x10^3/uL


(0.0-0.7)


 


Basophils # (Auto)


 0.0 x10^3/uL


(0.0-0.2)


 


Sodium Level


 139 mmol/L


(136-145)


 


Potassium Level


 3.5 mmol/L


(3.5-5.1)


 


Chloride Level


 104 mmol/L


()


 


Carbon Dioxide Level


 26 mmol/L


(21-32)


 


Anion Gap 9 (6-14) 


 


Blood Urea Nitrogen 7 mg/dL (8-26) 


 


Creatinine


 0.7 mg/dL


(0.7-1.3)


 


Estimated GFR


(Cockcroft-Gault) 148.2 





 


BUN/Creatinine Ratio 10 (6-20) 


 


Glucose Level


 118 mg/dL


(70-99)


 


Calcium Level


 7.7 mg/dL


(8.5-10.1)


 


Total Bilirubin


 0.5 mg/dL


(0.2-1.0)


 


Aspartate Amino Transf


(AST/SGOT) 7 U/L (15-37) 





 


Alanine Aminotransferase


(ALT/SGPT) < 6 U/L


(16-63)


 


Alkaline Phosphatase


 48 U/L


()


 


C-Reactive Protein,


Quantitative 121.8 mg/L


(0-3.3)


 


Total Protein


 7.1 g/dL


(6.4-8.2)


 


Albumin


 1.6 g/dL


(3.4-5.0)


 


Albumin/Globulin Ratio 0.3 (1.0-1.7) 











Physical Exam


Physical Exam


Chest clear





Abdomen soft





Assessment


Assessment


Colitis. History and present symptoms support ulcerative colitis but we need 

confirmation and plan for outpatient treatment.





Plan


Plan


Continue prednisone


Plan for flexible sigmoidoscopy with biopsies tomorrow


Due to lack of insurance, may be appropriate to have  involved 

since he will need some long-term treatment options











HOLDEN SHEPPARD MD               Nov 10, 2019 12:09

## 2019-11-10 NOTE — PDOC
PROGRESS NOTES


Chief Complaint


Chief Complaint


Ulcerative colitis vs infectious


Hx "colitis" 10 yrs ago_)- no scopes, manifested as the same, not on any home 

meds


Bloody stools, heme occult positive


Recent arthralgias - was taking ibuprofen 3 pills BID sometimes empty stomach


Sever PCM - albumin 1.6





History of Present Illness


History of Present Illness


REad very informative note gI Dr Stout


I agree, could be concerning for chronic process ie, UC vs crohns given similar 

hx 10 yrs ago


Started on PRed PO, he feels slightly better


Still mucousy red stools today


WBC mild 10-13, FOBT positive


BP holding, not tachy


Hgb ok so far


On IV cipro, flagyl empiric





PLAN:


WOuld keep clears, HH tmr


COnt IV cipro flagyl 


check ESR


Follow GI recs


I provided him copy of his CT and explained the findings


Will need scope, either OP or while in house - will defer to GI


 he denies fam hx IBD





Vitals


Vitals





Vital Signs








  Date Time  Temp Pulse Resp B/P (MAP) Pulse Ox O2 Delivery O2 Flow Rate FiO2


 


11/10/19 07:00 98.7 94 17 110/72 (85) 97 Room Air  





 98.7       











Physical Exam


General:  Alert, Oriented X3, Cooperative, No acute distress


Heart:  Regular rate, Normal S1, Normal S2


Abdomen:  Soft


Extremities:  No cyanosis


Skin:  No rashes, No breakdown





Labs


LABS





Laboratory Tests








Test


 11/10/19


06:45


 


White Blood Count


 10.1 x10^3/uL


(4.0-11.0)


 


Red Blood Count


 2.84 x10^6/uL


(4.30-5.70)


 


Hemoglobin


 7.6 g/dL


(13.0-17.5)


 


Hematocrit


 23.7 %


(39.0-53.0)


 


Mean Corpuscular Volume 83 fL () 


 


Mean Corpuscular Hemoglobin 27 pg (25-35) 


 


Mean Corpuscular Hemoglobin


Concent 32 g/dL


(31-37)


 


Red Cell Distribution Width


 13.6 %


(11.5-14.5)


 


Platelet Count


 355 x10^3/uL


(140-400)


 


Neutrophils (%) (Auto) 82 % (31-73) 


 


Lymphocytes (%) (Auto) 8 % (24-48) 


 


Monocytes (%) (Auto) 9 % (0-9) 


 


Eosinophils (%) (Auto) 0 % (0-3) 


 


Basophils (%) (Auto) 0 % (0-3) 


 


Neutrophils # (Auto)


 8.3 x10^3/uL


(1.8-7.7)


 


Lymphocytes # (Auto)


 0.9 x10^3/uL


(1.0-4.8)


 


Monocytes # (Auto)


 1.0 x10^3/uL


(0.0-1.1)


 


Eosinophils # (Auto)


 0.0 x10^3/uL


(0.0-0.7)


 


Basophils # (Auto)


 0.0 x10^3/uL


(0.0-0.2)


 


Sodium Level


 139 mmol/L


(136-145)


 


Potassium Level


 3.5 mmol/L


(3.5-5.1)


 


Chloride Level


 104 mmol/L


()


 


Carbon Dioxide Level


 26 mmol/L


(21-32)


 


Anion Gap 9 (6-14) 


 


Blood Urea Nitrogen 7 mg/dL (8-26) 


 


Creatinine


 0.7 mg/dL


(0.7-1.3)


 


Estimated GFR


(Cockcroft-Gault) 148.2 





 


BUN/Creatinine Ratio 10 (6-20) 


 


Glucose Level


 118 mg/dL


(70-99)


 


Calcium Level


 7.7 mg/dL


(8.5-10.1)


 


Total Bilirubin


 0.5 mg/dL


(0.2-1.0)


 


Aspartate Amino Transf


(AST/SGOT) 7 U/L (15-37) 





 


Alanine Aminotransferase


(ALT/SGPT) < 6 U/L


(16-63)


 


Alkaline Phosphatase


 48 U/L


()


 


C-Reactive Protein,


Quantitative 121.8 mg/L


(0-3.3)


 


Total Protein


 7.1 g/dL


(6.4-8.2)


 


Albumin


 1.6 g/dL


(3.4-5.0)


 


Albumin/Globulin Ratio 0.3 (1.0-1.7) 











Review of Systems


Review of Systems


abd dc, mild, mucousy jelly stools, no fever, all else neg





Assessment and Plan


Assessmemt and Plan


Problems


Medical Problems:


(1) Colitis


Status: Acute  











Comment


Review of Relevant


I have reviewed the following items justa (where applicable) has been applied.


Labs





Laboratory Tests








Test


 11/9/19


07:52 11/9/19


09:00 11/9/19


09:50 11/10/19


06:45


 


White Blood Count


 13.4 x10^3/uL


(4.0-11.0) 


 


 10.1 x10^3/uL


(4.0-11.0)


 


Red Blood Count


 3.63 x10^6/uL


(4.30-5.70) 


 


 2.84 x10^6/uL


(4.30-5.70)


 


Hemoglobin


 9.7 g/dL


(13.0-17.5) 


 


 7.6 g/dL


(13.0-17.5)


 


Hematocrit


 30.0 %


(39.0-53.0) 


 


 23.7 %


(39.0-53.0)


 


Mean Corpuscular Volume 83 fL ()    83 fL () 


 


Mean Corpuscular Hemoglobin 27 pg (25-35)    27 pg (25-35) 


 


Mean Corpuscular Hemoglobin


Concent 32 g/dL


(31-37) 


 


 32 g/dL


(31-37)


 


Red Cell Distribution Width


 13.6 %


(11.5-14.5) 


 


 13.6 %


(11.5-14.5)


 


Platelet Count


 508 x10^3/uL


(140-400) 


 


 355 x10^3/uL


(140-400)


 


Neutrophils (%) (Auto) 78 % (31-73)    82 % (31-73) 


 


Lymphocytes (%) (Auto) 11 % (24-48)    8 % (24-48) 


 


Monocytes (%) (Auto) 11 % (0-9)    9 % (0-9) 


 


Eosinophils (%) (Auto) 0 % (0-3)    0 % (0-3) 


 


Basophils (%) (Auto) 1 % (0-3)    0 % (0-3) 


 


Neutrophils # (Auto)


 10.5 x10^3/uL


(1.8-7.7) 


 


 8.3 x10^3/uL


(1.8-7.7)


 


Lymphocytes # (Auto)


 1.4 x10^3/uL


(1.0-4.8) 


 


 0.9 x10^3/uL


(1.0-4.8)


 


Monocytes # (Auto)


 1.4 x10^3/uL


(0.0-1.1) 


 


 1.0 x10^3/uL


(0.0-1.1)


 


Eosinophils # (Auto)


 0.0 x10^3/uL


(0.0-0.7) 


 


 0.0 x10^3/uL


(0.0-0.7)


 


Basophils # (Auto)


 0.1 x10^3/uL


(0.0-0.2) 


 


 0.0 x10^3/uL


(0.0-0.2)


 


Sodium Level


 135 mmol/L


(136-145) 


 


 139 mmol/L


(136-145)


 


Potassium Level


 3.5 mmol/L


(3.5-5.1) 


 


 3.5 mmol/L


(3.5-5.1)


 


Chloride Level


 95 mmol/L


() 


 


 104 mmol/L


()


 


Carbon Dioxide Level


 24 mmol/L


(21-32) 


 


 26 mmol/L


(21-32)


 


Anion Gap 16 (6-14)    9 (6-14) 


 


Blood Urea Nitrogen


 14 mg/dL


(8-26) 


 


 7 mg/dL (8-26) 





 


Creatinine


 0.8 mg/dL


(0.7-1.3) 


 


 0.7 mg/dL


(0.7-1.3)


 


Estimated GFR


(Cockcroft-Gault) 127.1 


 


 


 148.2 





 


BUN/Creatinine Ratio 18 (6-20)    10 (6-20) 


 


Glucose Level


 106 mg/dL


(70-99) 


 


 118 mg/dL


(70-99)


 


Calcium Level


 8.7 mg/dL


(8.5-10.1) 


 


 7.7 mg/dL


(8.5-10.1)


 


Total Bilirubin


 0.7 mg/dL


(0.2-1.0) 


 


 0.5 mg/dL


(0.2-1.0)


 


Aspartate Amino Transf


(AST/SGOT) 14 U/L (15-37) 


 


 


 7 U/L (15-37) 





 


Alanine Aminotransferase


(ALT/SGPT) < 6 U/L


(16-63) 


 


 < 6 U/L


(16-63)


 


Alkaline Phosphatase


 70 U/L


() 


 


 48 U/L


()


 


Total Protein


 9.6 g/dL


(6.4-8.2) 


 


 7.1 g/dL


(6.4-8.2)


 


Albumin


 2.3 g/dL


(3.4-5.0) 


 


 1.6 g/dL


(3.4-5.0)


 


Albumin/Globulin Ratio 0.3 (1.0-1.7)    0.3 (1.0-1.7) 


 


Lipase


 111 U/L


() 


 


 





 


Stool Occult Blood  Positive (NEG)   


 


Lactic Acid Level


 


 


 1.4 mmol/L


(0.4-2.0) 





 


C-Reactive Protein,


Quantitative 


 


 


 121.8 mg/L


(0-3.3)








Laboratory Tests








Test


 11/10/19


06:45


 


White Blood Count


 10.1 x10^3/uL


(4.0-11.0)


 


Red Blood Count


 2.84 x10^6/uL


(4.30-5.70)


 


Hemoglobin


 7.6 g/dL


(13.0-17.5)


 


Hematocrit


 23.7 %


(39.0-53.0)


 


Mean Corpuscular Volume 83 fL () 


 


Mean Corpuscular Hemoglobin 27 pg (25-35) 


 


Mean Corpuscular Hemoglobin


Concent 32 g/dL


(31-37)


 


Red Cell Distribution Width


 13.6 %


(11.5-14.5)


 


Platelet Count


 355 x10^3/uL


(140-400)


 


Neutrophils (%) (Auto) 82 % (31-73) 


 


Lymphocytes (%) (Auto) 8 % (24-48) 


 


Monocytes (%) (Auto) 9 % (0-9) 


 


Eosinophils (%) (Auto) 0 % (0-3) 


 


Basophils (%) (Auto) 0 % (0-3) 


 


Neutrophils # (Auto)


 8.3 x10^3/uL


(1.8-7.7)


 


Lymphocytes # (Auto)


 0.9 x10^3/uL


(1.0-4.8)


 


Monocytes # (Auto)


 1.0 x10^3/uL


(0.0-1.1)


 


Eosinophils # (Auto)


 0.0 x10^3/uL


(0.0-0.7)


 


Basophils # (Auto)


 0.0 x10^3/uL


(0.0-0.2)


 


Sodium Level


 139 mmol/L


(136-145)


 


Potassium Level


 3.5 mmol/L


(3.5-5.1)


 


Chloride Level


 104 mmol/L


()


 


Carbon Dioxide Level


 26 mmol/L


(21-32)


 


Anion Gap 9 (6-14) 


 


Blood Urea Nitrogen 7 mg/dL (8-26) 


 


Creatinine


 0.7 mg/dL


(0.7-1.3)


 


Estimated GFR


(Cockcroft-Gault) 148.2 





 


BUN/Creatinine Ratio 10 (6-20) 


 


Glucose Level


 118 mg/dL


(70-99)


 


Calcium Level


 7.7 mg/dL


(8.5-10.1)


 


Total Bilirubin


 0.5 mg/dL


(0.2-1.0)


 


Aspartate Amino Transf


(AST/SGOT) 7 U/L (15-37) 





 


Alanine Aminotransferase


(ALT/SGPT) < 6 U/L


(16-63)


 


Alkaline Phosphatase


 48 U/L


()


 


C-Reactive Protein,


Quantitative 121.8 mg/L


(0-3.3)


 


Total Protein


 7.1 g/dL


(6.4-8.2)


 


Albumin


 1.6 g/dL


(3.4-5.0)


 


Albumin/Globulin Ratio 0.3 (1.0-1.7) 








Microbiology


11/9/19 Blood Culture - Preliminary, Resulted


          NO GROWTH AFTER 1 DAY


Medications





Current Medications


Sodium Chloride 1,000 ml @  1,000 mls/hr 1X  ONCE IV  Last administered on 

11/9/19at 07:53;  Start 11/9/19 at 07:45;  Stop 11/9/19 at 08:44;  Status DC


Iohexol (Omnipaque 300 Mg/ml) 75 ml 1X  ONCE IV  Last administered on 11/9/19at 

08:59;  Start 11/9/19 at 08:45;  Stop 11/9/19 at 08:46;  Status DC


Info (CONTRAST GIVEN -- Rx MONITORING) 1 each PRN DAILY  PRN MC SEE COMMENTS;  

Start 11/9/19 at 08:45;  Stop 11/11/19 at 08:44


Ondansetron HCl (Zofran) 4 mg PRN Q8HRS  PRN IV NAUSEA/VOMITING;  Start 11/9/19 

at 09:45;  Stop 11/9/19 at 16:48;  Status DC


Morphine Sulfate (Morphine Sulfate) 2 mg PRN Q2HR  PRN IV PAIN;  Start 11/9/19 

at 09:45;  Stop 11/10/19 at 09:44;  Status DC


Sodium Chloride 1,000 ml @  75 mls/hr [id number] IV  Last administered on 11/9/19at

11:26;  Start 11/9/19 at 09:31;  Stop 11/9/19 at 19:47;  Status DC


Ciprofloxacin/ Dextrose 200 ml @  200 mls/hr 1X  ONCE IV  Last administered on 

11/9/19at 10:18;  Start 11/9/19 at 09:45;  Stop 11/9/19 at 10:44;  Status DC


Metronidazole 100 ml @  100 mls/hr 1X  ONCE IV  Last administered on 11/9/19at 

12:00;  Start 11/9/19 at 09:45;  Stop 11/9/19 at 10:44;  Status DC


Pantoprazole Sodium (PROTONIX VIAL for IV PUSH) 40 mg 1X  ONCE IVP  Last 

administered on 11/9/19at 11:27;  Start 11/9/19 at 09:45;  Stop 11/9/19 at 

09:46;  Status DC


Sodium Chloride 500 ml @  500 mls/hr 1X  ONCE IV  Last administered on 11/9/19at

10:00;  Start 11/9/19 at 10:00;  Stop 11/9/19 at 10:59;  Status DC


Prednisone (Prednisone) 40 mg DAILY PO  Last administered on 11/10/19at 10:54;  

Start 11/9/19 at 16:00;  Stop 11/13/19 at 09:01


Acetaminophen (Tylenol) 650 mg PRN Q4HRS  PRN PO Pain/ Fever ;  Start 11/9/19 at

15:45


Prednisone (Prednisone) 30 mg DAILY PO ;  Start 11/14/19 at 09:00;  Stop 

11/18/19 at 09:01


Prednisone (Prednisone) 20 mg DAILY PO ;  Start 11/19/19 at 09:00;  Stop 

11/23/19 at 09:01


Prednisone (Prednisone) 10 mg DAILY PO ;  Start 11/24/19 at 09:00;  Stop 

11/28/19 at 09:01


Ciprofloxacin/ Dextrose 200 ml @  200 mls/hr Q12HR IV  Last administered on 

11/10/19at 09:00;  Start 11/9/19 at 21:00


Metronidazole 100 ml @  100 mls/hr Q8HRS IV  Last administered on 11/10/19at 

06:13;  Start 11/9/19 at 22:00


Sodium Chloride 1,000 ml @  100 mls/hr Q10H IV  Last administered on 11/10/19at 

02:22;  Start 11/9/19 at 16:43


Ondansetron HCl (Zofran) 4 mg PRN Q4HRS  PRN IV NAUSEA/VOMITING;  Start 11/9/19 

at 16:45


Zolpidem Tartrate (Ambien) 5 mg PRN QHS  PRN PO INSOMNIA;  Start 11/9/19 at 

16:45


Acetaminophen (Tylenol) 650 mg PRN Q4HRS  PRN PO TEMP OVER 100.4F OR MILD PAIN; 

Start 11/9/19 at 16:45;  Stop 11/9/19 at 16:51;  Status DC


Clonidine HCl (Catapres) 0.1 mg PRN Q6HRS  PRN PO SBP>160 OR DBP>90;  Start 

11/9/19 at 16:45


Diphenhydramine HCl (Benadryl) 25 mg PRN Q4HRS  PRN IVP ITCHING;  Start 11/9/19 

at 16:45


Albuterol Sulfate (Ventolin Neb Soln) 2.5 mg PRN Q4HRS  PRN NEB SHORTNESS OF 

BREATH;  Start 11/9/19 at 16:45


Lorazepam (Ativan) 0.5 mg PRN Q4HRS  PRN PO ANXIETY / AGITATION;  Start 11/9/19 

at 16:45


Hydromorphone HCl (Dilaudid) 1 mg PRN Q2HRS  PRN IV SEVERE PAIN 7-10;  Start 

11/9/19 at 16:45


Acetaminophen/ Hydrocodone Bitart (Lortab 5/325) 1 tab PRN Q4HRS  PRN PO PAIN;  

Start 11/10/19 at 10:45


Ondansetron HCl (Zofran) 4 mg PRN Q6HRS  PRN IVP NAUSEA/VOMITING;  Start 

11/10/19 at 10:45


Vitals/I & O





Vital Sign - Last 24 Hours








 11/9/19 11/9/19 11/9/19 11/9/19





 11:00 14:30 15:00 19:00


 


Temp 98.9  101.0 99.7





 98.9  101.0 99.7


 


Pulse 105  115 112


 


Resp 18  16 20


 


B/P (MAP) 124/82 (96)  109/69 (82) 117/72 (87)


 


Pulse Ox 98  97 97


 


O2 Delivery Room Air Room Air Room Air 


 


    





    





 11/9/19 11/10/19 11/10/19 





 23:00 03:28 07:00 


 


Temp 100.0 99.0 98.7 





 100.0 99.0 98.7 


 


Pulse 99 92 94 


 


Resp 18 16 17 


 


B/P (MAP) 106/69 (81) 131/72 (91) 110/72 (85) 


 


Pulse Ox 99 97 97 


 


O2 Delivery   Room Air 














Intake and Output   


 


 11/9/19 11/9/19 11/10/19





 15:00 23:00 07:00


 


Intake Total 1800 ml 300 ml 1100 ml


 


Output Total   300 ml


 


Balance 1800 ml 300 ml 800 ml

















MARIIA BAUTISTA MD           Nov 10, 2019 11:03

## 2019-11-11 VITALS — SYSTOLIC BLOOD PRESSURE: 128 MMHG | DIASTOLIC BLOOD PRESSURE: 79 MMHG

## 2019-11-11 VITALS — SYSTOLIC BLOOD PRESSURE: 115 MMHG | DIASTOLIC BLOOD PRESSURE: 75 MMHG

## 2019-11-11 VITALS — SYSTOLIC BLOOD PRESSURE: 116 MMHG | DIASTOLIC BLOOD PRESSURE: 69 MMHG

## 2019-11-11 VITALS — SYSTOLIC BLOOD PRESSURE: 114 MMHG | DIASTOLIC BLOOD PRESSURE: 76 MMHG

## 2019-11-11 VITALS — SYSTOLIC BLOOD PRESSURE: 111 MMHG | DIASTOLIC BLOOD PRESSURE: 73 MMHG

## 2019-11-11 VITALS — SYSTOLIC BLOOD PRESSURE: 107 MMHG | DIASTOLIC BLOOD PRESSURE: 72 MMHG

## 2019-11-11 VITALS — DIASTOLIC BLOOD PRESSURE: 66 MMHG | SYSTOLIC BLOOD PRESSURE: 111 MMHG

## 2019-11-11 VITALS — DIASTOLIC BLOOD PRESSURE: 77 MMHG | SYSTOLIC BLOOD PRESSURE: 116 MMHG

## 2019-11-11 VITALS — SYSTOLIC BLOOD PRESSURE: 111 MMHG | DIASTOLIC BLOOD PRESSURE: 75 MMHG

## 2019-11-11 VITALS — SYSTOLIC BLOOD PRESSURE: 112 MMHG | DIASTOLIC BLOOD PRESSURE: 70 MMHG

## 2019-11-11 VITALS — DIASTOLIC BLOOD PRESSURE: 75 MMHG | SYSTOLIC BLOOD PRESSURE: 111 MMHG

## 2019-11-11 LAB
BASOPHILS # BLD AUTO: 0 X10^3/UL (ref 0–0.2)
BASOPHILS NFR BLD: 0 % (ref 0–3)
EOSINOPHIL NFR BLD: 0 % (ref 0–3)
EOSINOPHIL NFR BLD: 0 X10^3/UL (ref 0–0.7)
ERYTHROCYTE [DISTWIDTH] IN BLOOD BY AUTOMATED COUNT: 13.4 % (ref 11.5–14.5)
HCT VFR BLD CALC: 22.1 % (ref 39–53)
HGB BLD-MCNC: 7 G/DL (ref 13–17.5)
LYMPHOCYTES # BLD: 1.3 X10^3/UL (ref 1–4.8)
LYMPHOCYTES NFR BLD AUTO: 11 % (ref 24–48)
MCH RBC QN AUTO: 26 PG (ref 25–35)
MCHC RBC AUTO-ENTMCNC: 32 G/DL (ref 31–37)
MCV RBC AUTO: 83 FL (ref 79–100)
MONO #: 1.4 X10^3/UL (ref 0–1.1)
MONOCYTES NFR BLD: 12 % (ref 0–9)
NEUT #: 9.1 X10^3/UL (ref 1.8–7.7)
NEUTROPHILS NFR BLD AUTO: 77 % (ref 31–73)
PLATELET # BLD AUTO: 366 X10^3/UL (ref 140–400)
PROTHROMBIN TIME: 18.3 SEC (ref 11.7–14)
RBC # BLD AUTO: 2.66 X10^6/UL (ref 4.3–5.7)
WBC # BLD AUTO: 11.8 X10^3/UL (ref 4–11)

## 2019-11-11 PROCEDURE — 0DBN8ZX EXCISION OF SIGMOID COLON, VIA NATURAL OR ARTIFICIAL OPENING ENDOSCOPIC, DIAGNOSTIC: ICD-10-PCS | Performed by: INTERNAL MEDICINE

## 2019-11-11 RX ADMIN — CIPROFLOXACIN SCH MLS/HR: 2 INJECTION, SOLUTION INTRAVENOUS at 20:57

## 2019-11-11 RX ADMIN — BACITRACIN SCH MLS/HR: 5000 INJECTION, POWDER, FOR SOLUTION INTRAMUSCULAR at 03:21

## 2019-11-11 RX ADMIN — CIPROFLOXACIN SCH MLS/HR: 2 INJECTION, SOLUTION INTRAVENOUS at 14:37

## 2019-11-11 RX ADMIN — Medication SCH CAP: at 20:57

## 2019-11-11 RX ADMIN — BACITRACIN SCH MLS/HR: 5000 INJECTION, POWDER, FOR SOLUTION INTRAMUSCULAR at 20:57

## 2019-11-11 RX ADMIN — Medication SCH CAP: at 09:00

## 2019-11-11 NOTE — PDOC
PROGRESS NOTES


Chief Complaint


Chief Complaint


Ulcerative colitis vs infectious


Hx "colitis" 10 yrs ago_)- no scopes, manifested as the same, not on any home 

meds


Bloody stools, heme occult positive


Recent arthralgias - was taking ibuprofen 3 pills BID sometimes empty stomach


Sever PCM - albumin 1.6


Elevated INR





History of Present Illness


History of Present Illness


Could be concerning for chronic process ie, UC vs crohns given similar hx 10 yrs

ago


Started on PRed PO, he feels slightly better


Still mucousy red stools today


WBC mild 10-13, FOBT positive


BP holding, not tachy


Hgb ok so far


On IV cipro, flagyl empiric


Hb 7, transfusing today





PLAN:


WOuld keep clears, HH tmr


COnt IV cipro flagyl 


Follow GI recs


I provided him copy of his CT and explained the findings


Will need scope, NPO today for likely flex sig - will defer to GI


 he denies fam hx IBD





Vitals


Vitals





Vital Signs








  Date Time  Temp Pulse Resp B/P (MAP) Pulse Ox O2 Delivery O2 Flow Rate FiO2


 


11/11/19 09:18 98.9 95 18 114/76    





 98.9       


 


11/11/19 07:00     99 Room Air  











Physical Exam


General:  Alert, Oriented X3, Cooperative, No acute distress


Heart:  Regular rate, Normal S1, Normal S2


Abdomen:  Soft


Extremities:  No cyanosis


Skin:  No rashes, No breakdown





Labs


LABS





Laboratory Tests








Test


 11/11/19


04:25 11/11/19


06:35


 


White Blood Count


 11.8 x10^3/uL


(4.0-11.0) 





 


Red Blood Count


 2.66 x10^6/uL


(4.30-5.70) 





 


Hemoglobin


 7.0 g/dL


(13.0-17.5) 





 


Hematocrit


 22.1 %


(39.0-53.0) 





 


Mean Corpuscular Volume 83 fL ()  


 


Mean Corpuscular Hemoglobin 26 pg (25-35)  


 


Mean Corpuscular Hemoglobin


Concent 32 g/dL


(31-37) 





 


Red Cell Distribution Width


 13.4 %


(11.5-14.5) 





 


Platelet Count


 366 x10^3/uL


(140-400) 





 


Neutrophils (%) (Auto) 77 % (31-73)  


 


Lymphocytes (%) (Auto) 11 % (24-48)  


 


Monocytes (%) (Auto) 12 % (0-9)  


 


Eosinophils (%) (Auto) 0 % (0-3)  


 


Basophils (%) (Auto) 0 % (0-3)  


 


Neutrophils # (Auto)


 9.1 x10^3/uL


(1.8-7.7) 





 


Lymphocytes # (Auto)


 1.3 x10^3/uL


(1.0-4.8) 





 


Monocytes # (Auto)


 1.4 x10^3/uL


(0.0-1.1) 





 


Eosinophils # (Auto)


 0.0 x10^3/uL


(0.0-0.7) 





 


Basophils # (Auto)


 0.0 x10^3/uL


(0.0-0.2) 





 


Prothrombin Time


 


 18.3 SEC


(11.7-14.0)


 


Prothromb Time International


Ratio 


 1.6 (0.8-1.1) 














Assessment and Plan


Assessmemt and Plan


Problems


Medical Problems:


(1) Colitis


Status: Acute  











Comment


Review of Relevant


I have reviewed the following items justa (where applicable) has been applied.


Labs





Laboratory Tests








Test


 11/9/19


09:50 11/10/19


06:45 11/11/19


04:25 11/11/19


06:35


 


Lactic Acid Level


 1.4 mmol/L


(0.4-2.0) 


 


 





 


White Blood Count


 


 10.1 x10^3/uL


(4.0-11.0) 11.8 x10^3/uL


(4.0-11.0) 





 


Red Blood Count


 


 2.84 x10^6/uL


(4.30-5.70) 2.66 x10^6/uL


(4.30-5.70) 





 


Hemoglobin


 


 7.6 g/dL


(13.0-17.5) 7.0 g/dL


(13.0-17.5) 





 


Hematocrit


 


 23.7 %


(39.0-53.0) 22.1 %


(39.0-53.0) 





 


Mean Corpuscular Volume  83 fL ()  83 fL ()  


 


Mean Corpuscular Hemoglobin  27 pg (25-35)  26 pg (25-35)  


 


Mean Corpuscular Hemoglobin


Concent 


 32 g/dL


(31-37) 32 g/dL


(31-37) 





 


Red Cell Distribution Width


 


 13.6 %


(11.5-14.5) 13.4 %


(11.5-14.5) 





 


Platelet Count


 


 355 x10^3/uL


(140-400) 366 x10^3/uL


(140-400) 





 


Neutrophils (%) (Auto)  82 % (31-73)  77 % (31-73)  


 


Lymphocytes (%) (Auto)  8 % (24-48)  11 % (24-48)  


 


Monocytes (%) (Auto)  9 % (0-9)  12 % (0-9)  


 


Eosinophils (%) (Auto)  0 % (0-3)  0 % (0-3)  


 


Basophils (%) (Auto)  0 % (0-3)  0 % (0-3)  


 


Neutrophils # (Auto)


 


 8.3 x10^3/uL


(1.8-7.7) 9.1 x10^3/uL


(1.8-7.7) 





 


Lymphocytes # (Auto)


 


 0.9 x10^3/uL


(1.0-4.8) 1.3 x10^3/uL


(1.0-4.8) 





 


Monocytes # (Auto)


 


 1.0 x10^3/uL


(0.0-1.1) 1.4 x10^3/uL


(0.0-1.1) 





 


Eosinophils # (Auto)


 


 0.0 x10^3/uL


(0.0-0.7) 0.0 x10^3/uL


(0.0-0.7) 





 


Basophils # (Auto)


 


 0.0 x10^3/uL


(0.0-0.2) 0.0 x10^3/uL


(0.0-0.2) 





 


Sodium Level


 


 139 mmol/L


(136-145) 


 





 


Potassium Level


 


 3.5 mmol/L


(3.5-5.1) 


 





 


Chloride Level


 


 104 mmol/L


() 


 





 


Carbon Dioxide Level


 


 26 mmol/L


(21-32) 


 





 


Anion Gap  9 (6-14)   


 


Blood Urea Nitrogen  7 mg/dL (8-26)   


 


Creatinine


 


 0.7 mg/dL


(0.7-1.3) 


 





 


Estimated GFR


(Cockcroft-Gault) 


 148.2 


 


 





 


BUN/Creatinine Ratio  10 (6-20)   


 


Glucose Level


 


 118 mg/dL


(70-99) 


 





 


Calcium Level


 


 7.7 mg/dL


(8.5-10.1) 


 





 


Total Bilirubin


 


 0.5 mg/dL


(0.2-1.0) 


 





 


Aspartate Amino Transf


(AST/SGOT) 


 7 U/L (15-37) 


 


 





 


Alanine Aminotransferase


(ALT/SGPT) 


 < 6 U/L


(16-63) 


 





 


Alkaline Phosphatase


 


 48 U/L


() 


 





 


C-Reactive Protein,


Quantitative 


 121.8 mg/L


(0-3.3) 


 





 


Total Protein


 


 7.1 g/dL


(6.4-8.2) 


 





 


Albumin


 


 1.6 g/dL


(3.4-5.0) 


 





 


Albumin/Globulin Ratio  0.3 (1.0-1.7)   


 


Prothrombin Time


 


 


 


 18.3 SEC


(11.7-14.0)


 


Prothromb Time International


Ratio 


 


 


 1.6 (0.8-1.1) 











Laboratory Tests








Test


 11/11/19


04:25 11/11/19


06:35


 


White Blood Count


 11.8 x10^3/uL


(4.0-11.0) 





 


Red Blood Count


 2.66 x10^6/uL


(4.30-5.70) 





 


Hemoglobin


 7.0 g/dL


(13.0-17.5) 





 


Hematocrit


 22.1 %


(39.0-53.0) 





 


Mean Corpuscular Volume 83 fL ()  


 


Mean Corpuscular Hemoglobin 26 pg (25-35)  


 


Mean Corpuscular Hemoglobin


Concent 32 g/dL


(31-37) 





 


Red Cell Distribution Width


 13.4 %


(11.5-14.5) 





 


Platelet Count


 366 x10^3/uL


(140-400) 





 


Neutrophils (%) (Auto) 77 % (31-73)  


 


Lymphocytes (%) (Auto) 11 % (24-48)  


 


Monocytes (%) (Auto) 12 % (0-9)  


 


Eosinophils (%) (Auto) 0 % (0-3)  


 


Basophils (%) (Auto) 0 % (0-3)  


 


Neutrophils # (Auto)


 9.1 x10^3/uL


(1.8-7.7) 





 


Lymphocytes # (Auto)


 1.3 x10^3/uL


(1.0-4.8) 





 


Monocytes # (Auto)


 1.4 x10^3/uL


(0.0-1.1) 





 


Eosinophils # (Auto)


 0.0 x10^3/uL


(0.0-0.7) 





 


Basophils # (Auto)


 0.0 x10^3/uL


(0.0-0.2) 





 


Prothrombin Time


 


 18.3 SEC


(11.7-14.0)


 


Prothromb Time International


Ratio 


 1.6 (0.8-1.1) 











Microbiology


11/9/19 Blood Culture - Preliminary, Resulted


          NO GROWTH AFTER 1 DAY


Medications





Current Medications


Sodium Chloride 1,000 ml @  1,000 mls/hr 1X  ONCE IV  Last administered on 

11/9/19at 07:53;  Start 11/9/19 at 07:45;  Stop 11/9/19 at 08:44;  Status DC


Iohexol (Omnipaque 300 Mg/ml) 75 ml 1X  ONCE IV  Last administered on 11/9/19at 

08:59;  Start 11/9/19 at 08:45;  Stop 11/9/19 at 08:46;  Status DC


Info (CONTRAST GIVEN -- Rx MONITORING) 1 each PRN DAILY  PRN MC SEE COMMENTS;  

Start 11/9/19 at 08:45;  Stop 11/11/19 at 08:44;  Status DC


Ondansetron HCl (Zofran) 4 mg PRN Q8HRS  PRN IV NAUSEA/VOMITING;  Start 11/9/19 

at 09:45;  Stop 11/9/19 at 16:48;  Status DC


Morphine Sulfate (Morphine Sulfate) 2 mg PRN Q2HR  PRN IV PAIN;  Start 11/9/19 

at 09:45;  Stop 11/10/19 at 09:44;  Status DC


Sodium Chloride 1,000 ml @  75 mls/hr [id number] IV  Last administered on 11/9/19at

11:26;  Start 11/9/19 at 09:31;  Stop 11/9/19 at 19:47;  Status DC


Ciprofloxacin/ Dextrose 200 ml @  200 mls/hr 1X  ONCE IV  Last administered on 

11/9/19at 10:18;  Start 11/9/19 at 09:45;  Stop 11/9/19 at 10:44;  Status DC


Metronidazole 100 ml @  100 mls/hr 1X  ONCE IV  Last administered on 11/9/19at 

12:00;  Start 11/9/19 at 09:45;  Stop 11/9/19 at 10:44;  Status DC


Pantoprazole Sodium (PROTONIX VIAL for IV PUSH) 40 mg 1X  ONCE IVP  Last 

administered on 11/9/19at 11:27;  Start 11/9/19 at 09:45;  Stop 11/9/19 at 

09:46;  Status DC


Sodium Chloride 500 ml @  500 mls/hr 1X  ONCE IV  Last administered on 11/9/19at

10:00;  Start 11/9/19 at 10:00;  Stop 11/9/19 at 10:59;  Status DC


Prednisone (Prednisone) 40 mg DAILY PO  Last administered on 11/10/19at 10:54;  

Start 11/9/19 at 16:00;  Stop 11/13/19 at 09:01


Acetaminophen (Tylenol) 650 mg PRN Q4HRS  PRN PO MILD Pain/ Fever;  Start 

11/9/19 at 15:45


Prednisone (Prednisone) 30 mg DAILY PO ;  Start 11/14/19 at 09:00;  Stop 

11/18/19 at 09:01


Prednisone (Prednisone) 20 mg DAILY PO ;  Start 11/19/19 at 09:00;  Stop 

11/23/19 at 09:01


Prednisone (Prednisone) 10 mg DAILY PO ;  Start 11/24/19 at 09:00;  Stop 

11/28/19 at 09:01


Ciprofloxacin/ Dextrose 200 ml @  200 mls/hr Q12HR IV  Last administered on 

11/10/19at 20:36;  Start 11/9/19 at 21:00


Metronidazole 100 ml @  100 mls/hr Q8HRS IV  Last administered on 11/11/19at 

06:00;  Start 11/9/19 at 22:00


Sodium Chloride 1,000 ml @  100 mls/hr Q10H IV  Last administered on 11/11/19at 

03:21;  Start 11/9/19 at 16:43


Ondansetron HCl (Zofran) 4 mg PRN Q4HRS  PRN IV NAUSEA/VOMITING;  Start 11/9/19 

at 16:45;  Stop 11/10/19 at 14:41;  Status DC


Zolpidem Tartrate (Ambien) 5 mg PRN QHS  PRN PO INSOMNIA;  Start 11/9/19 at 

16:45


Acetaminophen (Tylenol) 650 mg PRN Q4HRS  PRN PO TEMP OVER 100.4F OR MILD PAIN; 

Start 11/9/19 at 16:45;  Stop 11/9/19 at 16:51;  Status DC


Clonidine HCl (Catapres) 0.1 mg PRN Q6HRS  PRN PO SBP>160 OR DBP>90;  Start 

11/9/19 at 16:45


Diphenhydramine HCl (Benadryl) 25 mg PRN Q4HRS  PRN IVP ITCHING;  Start 11/9/19 

at 16:45


Albuterol Sulfate (Ventolin Neb Soln) 2.5 mg PRN Q4HRS  PRN NEB SHORTNESS OF 

BREATH;  Start 11/9/19 at 16:45


Lorazepam (Ativan) 0.5 mg PRN Q4HRS  PRN PO ANXIETY / AGITATION;  Start 11/9/19 

at 16:45


Hydromorphone HCl (Dilaudid) 1 mg PRN Q2HRS  PRN IV SEVERE PAIN 7-10;  Start 

11/9/19 at 16:45


Acetaminophen/ Hydrocodone Bitart (Lortab 5/325) 1 tab PRN Q4HRS  PRN PO 

MODERATE-SEVERE PAIN;  Start 11/10/19 at 10:45


Ondansetron HCl (Zofran) 4 mg PRN Q6HRS  PRN IVP NAUSEA/VOMITING;  Start 

11/10/19 at 10:45


Magnesium Citrate (Citroma) 296 ml 1X  ONCE PO  Last administered on 11/10/19at 

17:24;  Start 11/10/19 at 18:15;  Stop 11/10/19 at 18:16;  Status DC


Lactobacillus Rhamnosus (Culturelle) 1 cap BID PO  Last administered on 

11/10/19at 22:11;  Start 11/10/19 at 21:00


Vitals/I & O





Vital Sign - Last 24 Hours








 11/10/19 11/10/19 11/10/19 11/10/19





 11:00 14:53 19:00 23:00


 


Temp 98.2 98.7 98.9 98.5





 98.2 98.7 98.9 98.5


 


Pulse 103 92 96 93


 


Resp 17 18 18 16


 


B/P (MAP) 108/70 (83) 103/72 (82) 114/78 (90) 114/78 (90)


 


Pulse Ox 99 100 99 100


 


O2 Delivery Room Air Room Air  


 


    





    





 11/11/19 11/11/19 11/11/19 





 03:00 07:00 09:18 


 


Temp 99.6 99.8 98.9 





 99.6 99.8 98.9 


 


Pulse 98 97 95 


 


Resp 16 18 18 


 


B/P (MAP) 111/66 (81) 111/73 (86) 114/76 


 


Pulse Ox 99 99  


 


O2 Delivery  Room Air  














Intake and Output   


 


 11/10/19 11/10/19 11/11/19





 15:00 23:00 07:00


 


Intake Total 920 ml 500 ml 2000 ml


 


Balance 920 ml 500 ml 2000 ml

















MANJINDER WOOTEN MD        Nov 11, 2019 09:21

## 2019-11-11 NOTE — NUR
SW following for discharge planning. Chart reviewed, discussed with RN. Pt NPO, having a 
procedure today. Pt listed as self pay, will be seen by [facility]. ANGELA will continue to 
follow.

## 2019-11-11 NOTE — PDOC4
Operative Note


Operative Note


Flexible sigmoidoscopy with biopsies


Meds propofol per anesthesia


Pre-op dx rectal bleeding/diarrhea


post-op dx ulcerative colitis to splenic flexure with \transition point at 

splenic flexure


                 s/p random biopsies


Plan oral medications as o/p











ARCHIE PATEL MD             Nov 11, 2019 12:54

## 2019-11-12 VITALS — SYSTOLIC BLOOD PRESSURE: 112 MMHG | DIASTOLIC BLOOD PRESSURE: 68 MMHG

## 2019-11-12 VITALS — DIASTOLIC BLOOD PRESSURE: 73 MMHG | SYSTOLIC BLOOD PRESSURE: 110 MMHG

## 2019-11-12 VITALS — DIASTOLIC BLOOD PRESSURE: 65 MMHG | SYSTOLIC BLOOD PRESSURE: 105 MMHG

## 2019-11-12 VITALS — SYSTOLIC BLOOD PRESSURE: 110 MMHG | DIASTOLIC BLOOD PRESSURE: 73 MMHG

## 2019-11-12 VITALS — SYSTOLIC BLOOD PRESSURE: 112 MMHG | DIASTOLIC BLOOD PRESSURE: 79 MMHG

## 2019-11-12 VITALS — DIASTOLIC BLOOD PRESSURE: 70 MMHG | SYSTOLIC BLOOD PRESSURE: 111 MMHG

## 2019-11-12 LAB
ERYTHROCYTE [DISTWIDTH] IN BLOOD BY AUTOMATED COUNT: 13.8 % (ref 11.5–14.5)
HCT VFR BLD CALC: 25.9 % (ref 39–53)
HGB BLD-MCNC: 8.5 G/DL (ref 13–17.5)
MCH RBC QN AUTO: 28 PG (ref 25–35)
MCHC RBC AUTO-ENTMCNC: 33 G/DL (ref 31–37)
MCV RBC AUTO: 84 FL (ref 79–100)
PLATELET # BLD AUTO: 350 X10^3/UL (ref 140–400)
RBC # BLD AUTO: 3.08 X10^6/UL (ref 4.3–5.7)
WBC # BLD AUTO: 11.3 X10^3/UL (ref 4–11)

## 2019-11-12 RX ADMIN — BACITRACIN SCH MLS/HR: 5000 INJECTION, POWDER, FOR SOLUTION INTRAMUSCULAR at 05:46

## 2019-11-12 RX ADMIN — BACITRACIN SCH MLS/HR: 5000 INJECTION, POWDER, FOR SOLUTION INTRAMUSCULAR at 17:47

## 2019-11-12 RX ADMIN — CIPROFLOXACIN SCH MLS/HR: 2 INJECTION, SOLUTION INTRAVENOUS at 09:01

## 2019-11-12 RX ADMIN — Medication SCH CAP: at 08:57

## 2019-11-12 RX ADMIN — Medication SCH CAP: at 20:40

## 2019-11-12 NOTE — NUR
SW following for discharge planning. Discussed with RN, [facility] is pending final dx for 
decision as to whether pt would be eligible for medicaid. Pt is on reg diet, cipro, flagyl. 
RN advised no SW needs. Dr. Mera anticipates discharge home tomorrow (11/13/19) with self 
care. SW will continue to follow should any discharge needs arise.

## 2019-11-12 NOTE — PDOC
PROGRESS NOTES


Chief Complaint


Chief Complaint


Ulcerative colitis vs infectious


Hx "colitis" 10 yrs ago - no scopes, manifested as the same, not on any home 

meds


Bloody stools, heme occult positive


Recent arthralgias - was taking ibuprofen 3 pills BID sometimes empty stomach


Sever protein calorie malnutrition - albumin 1.6, dietician to see


Elevated INR


Anemia - acute GI blood loss anemia





History of Present Illness


History of Present Illness


Mr Montanez is a 44-year-old male w/ PMHx 10 years ago of "colitis" was treated 

conservatively at that time with CT and no EGD or colonoscopy. He has had 

intermittent mucous and foamy blood in stools over the past 10 year, but for the

week prior to admit he has noticed some loosening of his bowel movements with 

increased mucous and occasional blood. He describes a bubbling in his abdomen 

but not significant crampy abdominal pain. He denies fever or chills, nausea or 

vomiting.  Does have a several week history of arthralgias his hands and feet 

and some other joints as well. He has been taking ibuprofen about 600 mg 

arthralgias symptoms. WBC 13K, Hb 9.7 initially, INR 1.6. GI was consulted.





Started on Pred PO, he feels slightly better, WBC mild 10-13, FOBT positive, 

started on IV cipro, flagyl empirically. Notably Hb 7, transfused to Hb 8.4 on 

11/11/2019.


S/p Flex Sigmoidoscopy 11/11/2019 -ulcerative colitis to splenic flexure with 

\transition point at splenic flexure s/p random biopsies. 





Temp 100.8F last night after transfusion, flex sig and dinner. Feeling better 

than last night. Started on mesalamine. Did not eat more than 2 bites of 

breakfast.





PLAN:


Can d/c cipro/flagyl


Monitor for further fevers, should have 24 hours fever free prior to d/c


Follow GI recs


I provided him copy of his CT and explained the findings


He denies fam hx IBD





Vitals


Vitals





Vital Signs








  Date Time  Temp Pulse Resp B/P (MAP) Pulse Ox O2 Delivery O2 Flow Rate FiO2


 


11/12/19 07:00 99.0 86 16 110/73 (85) 99 Room Air  





 99.0       


 


11/11/19 12:55       3 











Physical Exam


General:  Alert, Oriented X3, Cooperative, No acute distress


Heart:  Regular rate, Normal S1, Normal S2


Abdomen:  Soft


Extremities:  No cyanosis


Skin:  No rashes, No breakdown





Assessment and Plan


Assessmemt and Plan


Problems


Medical Problems:


(1) Colitis


Status: Acute  











Comment


Review of Relevant


I have reviewed the following items justa (where applicable) has been applied.


Labs





Laboratory Tests








Test


 11/11/19


04:25 11/11/19


06:35


 


White Blood Count


 11.8 x10^3/uL


(4.0-11.0) 





 


Red Blood Count


 2.66 x10^6/uL


(4.30-5.70) 





 


Hemoglobin


 7.0 g/dL


(13.0-17.5) 





 


Hematocrit


 22.1 %


(39.0-53.0) 





 


Mean Corpuscular Volume 83 fL ()  


 


Mean Corpuscular Hemoglobin 26 pg (25-35)  


 


Mean Corpuscular Hemoglobin


Concent 32 g/dL


(31-37) 





 


Red Cell Distribution Width


 13.4 %


(11.5-14.5) 





 


Platelet Count


 366 x10^3/uL


(140-400) 





 


Neutrophils (%) (Auto) 77 % (31-73)  


 


Lymphocytes (%) (Auto) 11 % (24-48)  


 


Monocytes (%) (Auto) 12 % (0-9)  


 


Eosinophils (%) (Auto) 0 % (0-3)  


 


Basophils (%) (Auto) 0 % (0-3)  


 


Neutrophils # (Auto)


 9.1 x10^3/uL


(1.8-7.7) 





 


Lymphocytes # (Auto)


 1.3 x10^3/uL


(1.0-4.8) 





 


Monocytes # (Auto)


 1.4 x10^3/uL


(0.0-1.1) 





 


Eosinophils # (Auto)


 0.0 x10^3/uL


(0.0-0.7) 





 


Basophils # (Auto)


 0.0 x10^3/uL


(0.0-0.2) 





 


Prothrombin Time


 


 18.3 SEC


(11.7-14.0)


 


Prothromb Time International


Ratio 


 1.6 (0.8-1.1) 











Microbiology


11/9/19 Blood Culture - Preliminary, Resulted


          NO GROWTH AFTER 2 DAYS


Medications





Current Medications


Sodium Chloride 1,000 ml @  1,000 mls/hr 1X  ONCE IV  Last administered on 

11/9/19at 07:53;  Start 11/9/19 at 07:45;  Stop 11/9/19 at 08:44;  Status DC


Iohexol (Omnipaque 300 Mg/ml) 75 ml 1X  ONCE IV  Last administered on 11/9/19at 

08:59;  Start 11/9/19 at 08:45;  Stop 11/9/19 at 08:46;  Status DC


Info (CONTRAST GIVEN -- Rx MONITORING) 1 each PRN DAILY  PRN MC SEE COMMENTS;  

Start 11/9/19 at 08:45;  Stop 11/11/19 at 08:44;  Status DC


Ondansetron HCl (Zofran) 4 mg PRN Q8HRS  PRN IV NAUSEA/VOMITING;  Start 11/9/19 

at 09:45;  Stop 11/9/19 at 16:48;  Status DC


Morphine Sulfate (Morphine Sulfate) 2 mg PRN Q2HR  PRN IV PAIN;  Start 11/9/19 

at 09:45;  Stop 11/10/19 at 09:44;  Status DC


Sodium Chloride 1,000 ml @  75 mls/hr [id number] IV  Last administered on 11/9/19at

11:26;  Start 11/9/19 at 09:31;  Stop 11/9/19 at 19:47;  Status DC


Ciprofloxacin/ Dextrose 200 ml @  200 mls/hr 1X  ONCE IV  Last administered on 

11/9/19at 10:18;  Start 11/9/19 at 09:45;  Stop 11/9/19 at 10:44;  Status DC


Metronidazole 100 ml @  100 mls/hr 1X  ONCE IV  Last administered on 11/9/19at 

12:00;  Start 11/9/19 at 09:45;  Stop 11/9/19 at 10:44;  Status DC


Pantoprazole Sodium (PROTONIX VIAL for IV PUSH) 40 mg 1X  ONCE IVP  Last 

administered on 11/9/19at 11:27;  Start 11/9/19 at 09:45;  Stop 11/9/19 at 

09:46;  Status DC


Sodium Chloride 500 ml @  500 mls/hr 1X  ONCE IV  Last administered on 11/9/19at

10:00;  Start 11/9/19 at 10:00;  Stop 11/9/19 at 10:59;  Status DC


Prednisone (Prednisone) 40 mg DAILY PO  Last administered on 11/11/19at 14:37;  

Start 11/9/19 at 16:00;  Stop 11/13/19 at 09:01


Acetaminophen (Tylenol) 650 mg PRN Q4HRS  PRN PO MILD Pain/ Fever;  Start 

11/9/19 at 15:45


Prednisone (Prednisone) 30 mg DAILY PO ;  Start 11/14/19 at 09:00;  Stop 

11/18/19 at 09:01


Prednisone (Prednisone) 20 mg DAILY PO ;  Start 11/19/19 at 09:00;  Stop 

11/23/19 at 09:01


Prednisone (Prednisone) 10 mg DAILY PO ;  Start 11/24/19 at 09:00;  Stop 

11/28/19 at 09:01


Ciprofloxacin/ Dextrose 200 ml @  200 mls/hr Q12HR IV  Last administered on 

11/11/19at 20:57;  Start 11/9/19 at 21:00


Metronidazole 100 ml @  100 mls/hr Q8HRS IV  Last administered on 11/12/19at 

05:45;  Start 11/9/19 at 22:00


Sodium Chloride 1,000 ml @  100 mls/hr Q10H IV  Last administered on 11/12/19at 

05:46;  Start 11/9/19 at 16:43


Ondansetron HCl (Zofran) 4 mg PRN Q4HRS  PRN IV NAUSEA/VOMITING;  Start 11/9/19 

at 16:45;  Stop 11/10/19 at 14:41;  Status DC


Zolpidem Tartrate (Ambien) 5 mg PRN QHS  PRN PO INSOMNIA;  Start 11/9/19 at 

16:45


Acetaminophen (Tylenol) 650 mg PRN Q4HRS  PRN PO TEMP OVER 100.4F OR MILD PAIN; 

Start 11/9/19 at 16:45;  Stop 11/9/19 at 16:51;  Status DC


Clonidine HCl (Catapres) 0.1 mg PRN Q6HRS  PRN PO SBP>160 OR DBP>90;  Start 

11/9/19 at 16:45


Diphenhydramine HCl (Benadryl) 25 mg PRN Q4HRS  PRN IVP ITCHING;  Start 11/9/19 

at 16:45


Albuterol Sulfate (Ventolin Neb Soln) 2.5 mg PRN Q4HRS  PRN NEB SHORTNESS OF 

BREATH;  Start 11/9/19 at 16:45


Lorazepam (Ativan) 0.5 mg PRN Q4HRS  PRN PO ANXIETY / AGITATION;  Start 11/9/19 

at 16:45


Hydromorphone HCl (Dilaudid) 1 mg PRN Q2HRS  PRN IV SEVERE PAIN 7-10;  Start 

11/9/19 at 16:45


Acetaminophen/ Hydrocodone Bitart (Lortab 5/325) 1 tab PRN Q4HRS  PRN PO 

MODERATE-SEVERE PAIN;  Start 11/10/19 at 10:45


Ondansetron HCl (Zofran) 4 mg PRN Q6HRS  PRN IVP NAUSEA/VOMITING;  Start 

11/10/19 at 10:45


Magnesium Citrate (Citroma) 296 ml 1X  ONCE PO  Last administered on 11/10/19at 

17:24;  Start 11/10/19 at 18:15;  Stop 11/10/19 at 18:16;  Status DC


Lactobacillus Rhamnosus (Culturelle) 1 cap BID PO  Last administered on 

11/11/19at 20:57;  Start 11/10/19 at 21:00


Ringer's Solution 1,000 ml @  75 mls/hr 1X  ONCE IV  Last administered on 

11/11/19at 09:45;  Start 11/11/19 at 09:45;  Stop 11/11/19 at 23:04;  Status DC


Propofol 20 ml @ As Directed STK-MED ONCE IV ;  Start 11/11/19 at 12:25;  Stop 

11/11/19 at 12:25;  Status DC


Lidocaine HCl (Lidocaine Pf 2% Vial) 5 ml STK-MED ONCE .ROUTE ;  Start 11/11/19 

at 12:25;  Stop 11/11/19 at 12:25;  Status DC


Vitals/I & O





Vital Sign - Last 24 Hours








 11/11/19 11/11/19 11/11/19 11/11/19





 09:18 09:33 10:33 10:55


 


Temp 98.9 99.1 98.1 98.1





 98.9 99.1 98.1 98.1


 


Pulse 95 94 100 100


 


Resp 18 16 18 17


 


B/P (MAP) 114/76 116/77 111/75 111/75 (87)


 


Pulse Ox    100


 


O2 Delivery    Room Air


 


    





    





 11/11/19 11/11/19 11/11/19 11/11/19





 11:33 12:07 12:12 12:45


 


Temp 98.1  99.5 99.5





 98.1  99.5 99.5


 


Pulse 96  92 93


 


Resp 16  20 18


 


B/P (MAP) 107/72   128/79


 


Pulse Ox   99 


 


O2 Delivery  Room Air  


 


    





    





 11/11/19 11/11/19 11/11/19 11/11/19





 12:55 13:10 15:00 19:00


 


Temp 97.9 97.9 99.6 100.8





 97.9 97.9 99.6 100.8


 


Pulse 109 109 96 83


 


Resp 16 18 16 18


 


B/P (MAP) 137/81 113/63 115/75 (88) 116/69 (85)


 


Pulse Ox 100 100 100 97


 


O2 Delivery Room Air Room Air Room Air Room Air


 


O2 Flow Rate 3   


 


    





    





 11/11/19 11/11/19 11/12/19 11/12/19





 19:45 23:00 03:00 07:00


 


Temp  98.8 98.9 99.0





  98.8 98.9 99.0


 


Pulse  100 96 86


 


Resp  18 17 16


 


B/P (MAP)  112/70 (84) 105/65 (78) 110/73 (85)


 


Pulse Ox  98 98 99


 


O2 Delivery Room Air Room Air Room Air Room Air














Intake and Output   


 


 11/11/19 11/11/19 11/12/19





 15:00 23:00 07:00


 


Intake Total 510 ml 920 ml 200 ml


 


Balance 510 ml 920 ml 200 ml

















MANJINDER WOOTEN MD        Nov 12, 2019 08:47

## 2019-11-12 NOTE — PDOC
Subjective:


Subjective:


Feels slightly better than when admitted - appetite isn't great, less bleeding, 

less abdominal pain, not really having diarrhea.





Objective:


Vital Signs:





                                   Vital Signs








  Date Time  Temp Pulse Resp B/P (MAP) Pulse Ox O2 Delivery O2 Flow Rate FiO2


 


11/12/19 07:00 99.0 86 16 110/73 (85) 99 Room Air  





 99.0       


 


11/11/19 12:55       3 








Imaging:


FS 11/11


ulcerative colitis to splenic flexure with \transition point at splenic flexure


                 s/p random biopsies





PE:





GEN: NAD - breakfast tray w/ only a few bites eaten


LUNGS: CTAB


HEART: RRR


ABD: BS+, soft, mild discomfort


NEURO/PSYCH: A & O 3





A/P:


UC, anemia, hypoalbuminemia


Decreased appetite, abd discomfort


Fever





--


Transfused yesterday - recheck Hgb.


DC per primary - Dr. Baird has recommended sulfasalazine QID (probably more 

affordable than Delzicol) + prednisone 20mg BID and plan for outpt follow-up in 

the office.


Might benefit from adding Ensure, etc.


Empiric acid-reducer.











AGUSTIN DELCID         Nov 12, 2019 09:49

## 2019-11-13 VITALS — SYSTOLIC BLOOD PRESSURE: 108 MMHG

## 2019-11-13 VITALS — SYSTOLIC BLOOD PRESSURE: 111 MMHG | DIASTOLIC BLOOD PRESSURE: 78 MMHG

## 2019-11-13 VITALS — DIASTOLIC BLOOD PRESSURE: 69 MMHG | SYSTOLIC BLOOD PRESSURE: 105 MMHG

## 2019-11-13 RX ADMIN — Medication SCH CAP: at 09:36

## 2019-11-13 RX ADMIN — BACITRACIN SCH MLS/HR: 5000 INJECTION, POWDER, FOR SOLUTION INTRAMUSCULAR at 04:46

## 2019-11-13 NOTE — NUR
Discharge Note:



JONAS PULLIAM



Discharge instructions and discharge home medications reviewed with Patient and a copy 
given. All questions have been answered and understanding verbalized. 



The following instructions and handouts were given: Discharge Instructions, Patient 
Education regarding Ulcerative Colitis, Prescriptions. 



Discontinued lines and drains: PIV removed, Catheter intact.



Patient discharged to Home with Self-Care via Private Vehicle

## 2019-11-13 NOTE — PATHOLOGY
[facility] Accession Number: [id number]

.                                                                01

Material submitted:                                        .

colon - LEFT COLON BIOPSY. Modifiers: left

.                                                                01

Clinical history:                                          .

Pre-OP DX: Diarrhea, abdominal pain

Post-OP DX: Ulcerative colitis

.                                                                02

**********************************************************************

Diagnosis:

Colonic mucosa, left colon biopsies:

- Active chronic colitis, moderate to marked, without granulomas or

specific features.

(JPM:zena; 11/13/2019)

S  11/13/2019  1505 Local

**********************************************************************

.                                                                02

Comment:

Sections of the left colon biopsy reveal multiple segments of colonic

mucosa showing moderate to marked active chronic inflammation with foci of

acute cryptitis, crypt architectural distortion, and ulceration.  There

are no granulomas or specific features.  The findings are compatible with

idiopathic chronic inflammatory bowel disease and are consistent with

ulcerative colitis. There is no dysplasia or evidence of malignancy.

(JPM:zena; 11/13/2019)

.                                                                02

Electronically signed:                                     .

Shady Voss MD, Pathologist

NPI- [id number]

.                                                                01

Gross description:                                         .

Received in formalin labeled "Mariano Montanez, left colon BX," are multiple

segments of tan soft tissue measuring 1.3 x 0.8 x 0.1 cm in aggregate

dimensions.  The specimen is filtered and entirely submitted in cassette

A1.

(TSD; 11/12/2019)

TOB/TOB  11/12/2019  1635 Local

.                                                                02

Pathologist provided ICD-10:

K52.9

.                                                                02

CPT                                                        .

513037

Specimen Comment: A courtesy copy of this report has been sent to [phone], [phone]

Specimen Comment: [id number]

Specimen Comment: Report sent to  / DR YIN

***Performed at:  01

   [address], [place]  [id number]

   MD Jovani Douglas MD Phone:  [phone]

***Performed at:  02

   [address]  [id number]

   MD Shady Voss MD Phone:  [phone]

## 2019-11-13 NOTE — NUR
SW following. Chart reviewed, discussed with RN, pt is discharging home today with self 
care. RN advised no SW needs.

## 2019-11-13 NOTE — PDOC
PROGRESS NOTES


Chief Complaint


Chief Complaint


Ulcerative colitis vs infectious


Hx "colitis" 10 yrs ago - no scopes, manifested as the same, not on any home 

meds


Bloody stools, heme occult positive


Recent arthralgias - was taking ibuprofen 3 pills BID sometimes empty stomach


Sever protein calorie malnutrition - albumin 1.6, dietician to see


Elevated INR


Anemia - acute GI blood loss anemia





History of Present Illness


History of Present Illness


Mr Montanez is a 44-year-old male w/ PMHx 10 years ago of "colitis" was treated 

conservatively at that time with CT and no EGD or colonoscopy. He has had 

intermittent mucous and foamy blood in stools over the past 10 year, but for the

week prior to admit he has noticed some loosening of his bowel movements with 

increased mucous and occasional blood. He describes a bubbling in his abdomen 

but not significant crampy abdominal pain. He denies fever or chills, nausea or 

vomiting.  Does have a several week history of arthralgias his hands and feet 

and some other joints as well. He has been taking ibuprofen about 600 mg 

arthralgias symptoms. WBC 13K, Hb 9.7 initially, INR 1.6. GI was consulted.





Started on Pred PO, he feels slightly better, WBC mild 10-13, FOBT positive, 

started on IV cipro, flagyl empirically. Notably Hb 7, transfused to Hb 8.4 on 

11/11/2019.


S/p Flex Sigmoidoscopy 11/11/2019 -ulcerative colitis to splenic flexure with 

\transition point at splenic flexure s/p random biopsies. 


11/12: Temp 100.8F last night after transfusion, flex sig and dinner. Feeling 

better than last night. Started on mesalamine. Did not eat more than 2 bites of 

breakfast.





Feeling much better today. Pathology returned active chronic colitis, moderate 

to marked, without granulomas or specific features.





PLAN:


Can d/c cipro/flagyl


Follow GI recs


I provided him copy of his CT and explained the findings


He denies fam hx IBD


Mesalamine 4 times daily, pred 20mg BID, f/u with GI





Vitals


Vitals





Vital Signs








  Date Time  Temp Pulse Resp B/P (MAP) Pulse Ox O2 Delivery O2 Flow Rate FiO2


 


11/13/19 11:00 98.3 83 17 111/78 (89) 99 Room Air  





 98.3       











Physical Exam


General:  Alert, Oriented X3, Cooperative, No acute distress


Heart:  Regular rate, Normal S1, Normal S2


Abdomen:  Soft


Extremities:  No cyanosis


Skin:  No rashes, No breakdown





Assessment and Plan


Assessmemt and Plan


Problems


Medical Problems:


(1) Colitis


Status: Acute  











Comment


Review of Relevant


I have reviewed the following items justa (where applicable) has been applied.


Labs





Laboratory Tests








Test


 11/12/19


09:40


 


White Blood Count


 11.3 x10^3/uL


(4.0-11.0)


 


Red Blood Count


 3.08 x10^6/uL


(4.30-5.70)


 


Hemoglobin


 8.5 g/dL


(13.0-17.5)


 


Hematocrit


 25.9 %


(39.0-53.0)


 


Mean Corpuscular Volume 84 fL () 


 


Mean Corpuscular Hemoglobin 28 pg (25-35) 


 


Mean Corpuscular Hemoglobin


Concent 33 g/dL


(31-37)


 


Red Cell Distribution Width


 13.8 %


(11.5-14.5)


 


Platelet Count


 350 x10^3/uL


(140-400)








Microbiology


11/9/19 Urine Culture - Final, Complete


          


11/9/19 Urine Culture Result 1 (DOROTHEA) - Final, Complete


          


11/9/19 Blood Culture - Preliminary, Resulted


          NO GROWTH AFTER 3 DAYS


11/9/19 Stool Culture - Preliminary, Resulted


          


11/9/19 Stool Culture Result 1 (DOROTHEA) - Preliminary, Resulted


          


11/9/19 Campylobacter Antigen Assay, Resulted


          Pending


11/9/19 Campylobactor Result 1, Resulted


          Pending


11/9/19 Shiga Toxin Test, Resulted


          Pending


Medications





Current Medications


Sodium Chloride 1,000 ml @  1,000 mls/hr 1X  ONCE IV  Last administered on 

11/9/19at 07:53;  Start 11/9/19 at 07:45;  Stop 11/9/19 at 08:44;  Status DC


Iohexol (Omnipaque 300 Mg/ml) 75 ml 1X  ONCE IV  Last administered on 11/9/19at 

08:59;  Start 11/9/19 at 08:45;  Stop 11/9/19 at 08:46;  Status DC


Info (CONTRAST GIVEN -- Rx MONITORING) 1 each PRN DAILY  PRN MC SEE COMMENTS;  

Start 11/9/19 at 08:45;  Stop 11/11/19 at 08:44;  Status DC


Ondansetron HCl (Zofran) 4 mg PRN Q8HRS  PRN IV NAUSEA/VOMITING;  Start 11/9/19 

at 09:45;  Stop 11/9/19 at 16:48;  Status DC


Morphine Sulfate (Morphine Sulfate) 2 mg PRN Q2HR  PRN IV PAIN;  Start 11/9/19 

at 09:45;  Stop 11/10/19 at 09:44;  Status DC


Sodium Chloride 1,000 ml @  75 mls/hr [id number] IV  Last administered on 11/9/19at

11:26;  Start 11/9/19 at 09:31;  Stop 11/9/19 at 19:47;  Status DC


Ciprofloxacin/ Dextrose 200 ml @  200 mls/hr 1X  ONCE IV  Last administered on 

11/9/19at 10:18;  Start 11/9/19 at 09:45;  Stop 11/9/19 at 10:44;  Status DC


Metronidazole 100 ml @  100 mls/hr 1X  ONCE IV  Last administered on 11/9/19at 

12:00;  Start 11/9/19 at 09:45;  Stop 11/9/19 at 10:44;  Status DC


Pantoprazole Sodium (PROTONIX VIAL for IV PUSH) 40 mg 1X  ONCE IVP  Last 

administered on 11/9/19at 11:27;  Start 11/9/19 at 09:45;  Stop 11/9/19 at 

09:46;  Status DC


Sodium Chloride 500 ml @  500 mls/hr 1X  ONCE IV  Last administered on 11/9/19at

10:00;  Start 11/9/19 at 10:00;  Stop 11/9/19 at 10:59;  Status DC


Prednisone (Prednisone) 40 mg DAILY PO  Last administered on 11/11/19at 14:37;  

Start 11/9/19 at 16:00;  Stop 11/12/19 at 08:47;  Status DC


Acetaminophen (Tylenol) 650 mg PRN Q4HRS  PRN PO MILD Pain/ Fever;  Start 

11/9/19 at 15:45


Prednisone (Prednisone) 30 mg DAILY PO ;  Start 11/14/19 at 09:00;  Stop 

11/12/19 at 08:47;  Status DC


Prednisone (Prednisone) 20 mg DAILY PO ;  Start 11/19/19 at 09:00;  Stop 

11/12/19 at 08:47;  Status DC


Prednisone (Prednisone) 10 mg DAILY PO ;  Start 11/24/19 at 09:00;  Stop 

11/12/19 at 08:47;  Status DC


Ciprofloxacin/ Dextrose 200 ml @  200 mls/hr Q12HR IV  Last administered on 

11/12/19at 09:01;  Start 11/9/19 at 21:00;  Stop 11/12/19 at 13:59;  Status DC


Metronidazole 100 ml @  100 mls/hr Q8HRS IV  Last administered on 11/12/19at 

13:58;  Start 11/9/19 at 22:00;  Stop 11/12/19 at 13:59;  Status DC


Sodium Chloride 1,000 ml @  100 mls/hr Q10H IV  Last administered on 11/13/19at 

04:46;  Start 11/9/19 at 16:43


Ondansetron HCl (Zofran) 4 mg PRN Q4HRS  PRN IV NAUSEA/VOMITING;  Start 11/9/19 

at 16:45;  Stop 11/10/19 at 14:41;  Status DC


Zolpidem Tartrate (Ambien) 5 mg PRN QHS  PRN PO INSOMNIA;  Start 11/9/19 at 

16:45


Acetaminophen (Tylenol) 650 mg PRN Q4HRS  PRN PO TEMP OVER 100.4F OR MILD PAIN; 

Start 11/9/19 at 16:45;  Stop 11/9/19 at 16:51;  Status DC


Clonidine HCl (Catapres) 0.1 mg PRN Q6HRS  PRN PO SBP>160 OR DBP>90;  Start 

11/9/19 at 16:45


Diphenhydramine HCl (Benadryl) 25 mg PRN Q4HRS  PRN IVP ITCHING;  Start 11/9/19 

at 16:45


Albuterol Sulfate (Ventolin Neb Soln) 2.5 mg PRN Q4HRS  PRN NEB SHORTNESS OF 

BREATH;  Start 11/9/19 at 16:45


Lorazepam (Ativan) 0.5 mg PRN Q4HRS  PRN PO ANXIETY / AGITATION;  Start 11/9/19 

at 16:45


Hydromorphone HCl (Dilaudid) 1 mg PRN Q2HRS  PRN IV SEVERE PAIN 7-10;  Start 

11/9/19 at 16:45


Acetaminophen/ Hydrocodone Bitart (Lortab 5/325) 1 tab PRN Q4HRS  PRN PO 

MODERATE-SEVERE PAIN;  Start 11/10/19 at 10:45


Ondansetron HCl (Zofran) 4 mg PRN Q6HRS  PRN IVP NAUSEA/VOMITING;  Start 

11/10/19 at 10:45


Magnesium Citrate (Citroma) 296 ml 1X  ONCE PO  Last administered on 11/10/19at 

17:24;  Start 11/10/19 at 18:15;  Stop 11/10/19 at 18:16;  Status DC


Lactobacillus Rhamnosus (Culturelle) 1 cap BID PO  Last administered on 

11/13/19at 09:36;  Start 11/10/19 at 21:00


Ringer's Solution 1,000 ml @  75 mls/hr 1X  ONCE IV  Last administered on 

11/11/19at 09:45;  Start 11/11/19 at 09:45;  Stop 11/11/19 at 23:04;  Status DC


Propofol 20 ml @ As Directed STK-MED ONCE IV ;  Start 11/11/19 at 12:25;  Stop 

11/11/19 at 12:25;  Status DC


Lidocaine HCl (Lidocaine Pf 2% Vial) 5 ml STK-MED ONCE .ROUTE ;  Start 11/11/19 

at 12:25;  Stop 11/11/19 at 12:25;  Status DC


Prednisone (Prednisone) 20 mg BID PO  Last administered on 11/13/19at 09:35;  

Start 11/12/19 at 09:00


Mesalamine (Delzicol) 400 mg [id number] PO  Last administered on 11/12/19at 08:57; 

Start 11/12/19 at 09:00;  Stop 11/12/19 at 09:50;  Status DC


Sulfasalazine (Azulfidine) 500 mg QID PO  Last administered on 11/13/19at 09:36;

 Start 11/12/19 at 13:00


Famotidine (Pepcid) 20 mg QHS PO  Last administered on 11/12/19at 20:41;  Start 

11/12/19 at 21:00





Active Scripts


Active


Protonix  ** (Pantoprazole Sodium) 40 Mg Tablet.dr 40 Mg PO DAILYAC 30 Days


Culturelle (Lactobacillus Rhamnosus Gg) 1 Each Cap.sprink 1 Cap PO BID 30 Days


Prednisone 20 Mg Tablet 20 Mg PO BID 30 Days


Azulfidine (Sulfasalazine) 500 Mg Tablet 500 Mg PO QID 30 Days


Vitals/I & O





Vital Sign - Last 24 Hours








 11/12/19 11/12/19 11/12/19 11/12/19





 15:00 19:00 20:00 23:00


 


Temp 99.6 98.1  98.1





 99.6 98.1  98.1


 


Pulse 68 85  89


 


Resp 16 18  18


 


B/P (MAP) 111/70 (84) 112/79 (90)  112/68 (83)


 


Pulse Ox 99 98  99


 


O2 Delivery Room Air Room Air Room Air Room Air


 


    





    





 11/13/19 11/13/19 11/13/19 11/13/19





 03:00 07:00 08:00 11:00


 


Temp 98.0 98.7  98.3





 98.0 98.7  98.3


 


Pulse 84 90  83


 


Resp 18 17  17


 


B/P (MAP) 105/69 (81) 108/  111/78 (89)


 


Pulse Ox 98 98  99


 


O2 Delivery Room Air Room Air Room Air Room Air














Intake and Output   


 


 11/12/19 11/12/19 11/13/19





 15:00 23:00 07:00


 


Intake Total 370 ml 200 ml 0 ml


 


Balance 370 ml 200 ml 0 ml

















MANJINDER WOOTEN MD        Nov 13, 2019 12:19

## 2019-11-13 NOTE — PDOC
Subjective:


Subjective:


"It's getting better now."


Diarrhea - 2-3 times today, none overnight.


More appetite.


No bleeding.


Feels well enough to go home.





Objective:


Objective:


D/w nurse - ate more for breakfast today.


Vital Signs:





                                   Vital Signs








  Date Time  Temp Pulse Resp B/P (MAP) Pulse Ox O2 Delivery O2 Flow Rate FiO2


 


11/13/19 11:00 98.3 83 17 111/78 (89) 99 Room Air  





 98.3       








Labs:





  BLOOD CULTURE  Preliminary  


        NO GROWTH AFTER 4 DAYS





PE:





GEN: NAD


LUNGS: CTAB


HEART: RRR


ABD: NABS, S/ND/NT


NEURO/PSYCH: A & O 3





A/P:


UC - on prednisone and sulfasalazine


Anemia - improved w/ transfusion


Decreased appetite, abd discomfort - improved, started on PPI





--


DC per primary, follow-up in the office as discussed.











AGUSTIN DELCID         Nov 13, 2019 11:41

## 2019-11-13 NOTE — PDOC3
Discharge Summary


Visit Information


Date of Admission:  Nov 9, 2019


Date of Discharge:  Nov 13, 2019


Admitting Diagnosis:  Colitis


Final Diagnosis


Problems


Medical Problems:


(1) Colitis


Status: Acute  











Brief Hospital Course


Allergies





                                    Allergies








Coded Allergies Type Severity Reaction Last Updated Verified


 


  No Known Drug Allergies    11/11/19 No








Vital Signs





Vital Signs








  Date Time  Temp Pulse Resp B/P (MAP) Pulse Ox O2 Delivery O2 Flow Rate FiO2


 


11/13/19 11:00 98.3 83 17 111/78 (89) 99 Room Air  





 98.3       








Lab Results





Laboratory Tests








Test


 11/12/19


09:40


 


White Blood Count


 11.3 x10^3/uL


(4.0-11.0)


 


Red Blood Count


 3.08 x10^6/uL


(4.30-5.70)


 


Hemoglobin


 8.5 g/dL


(13.0-17.5)


 


Hematocrit


 25.9 %


(39.0-53.0)


 


Mean Corpuscular Volume 84 fL () 


 


Mean Corpuscular Hemoglobin 28 pg (25-35) 


 


Mean Corpuscular Hemoglobin


Concent 33 g/dL


(31-37)


 


Red Cell Distribution Width


 13.8 %


(11.5-14.5)


 


Platelet Count


 350 x10^3/uL


(140-400)








Brief Hospital Course


Mr Montanez is a 44-year-old male w/ PMHx 10 years ago of "colitis" was treated 

conservatively at that time with CT and no EGD or colonoscopy. He has had 

intermittent mucous and foamy blood in stools over the past 10 year, but for the

week prior to admit he has noticed some loosening of his bowel movements with 

increased mucous and occasional blood. He describes a bubbling in his abdomen 

but not significant crampy abdominal pain. He denies fever or chills, nausea or 

vomiting.  Does have a several week history of arthralgias his hands and feet 

and some other joints as well. He has been taking ibuprofen about 600 mg 

arthralgias symptoms. WBC 13K, Hb 9.7 initially, INR 1.6. GI was consulted.





Started on Pred PO, he feels slightly better, WBC mild 10-13, FOBT positive, 

started on IV cipro, flagyl empirically. Notably Hb 7, transfused to Hb 8.4 on 

11/11/2019.


S/p Flex Sigmoidoscopy 11/11/2019 -ulcerative colitis to splenic flexure with 

\transition point at splenic flexure s/p random biopsies. 


11/12: Temp 100.8F last night after transfusion, flex sig and dinner. Feeling 

better than last night. Started on mesalamine. Did not eat more than 2 bites of 

breakfast.





Feeling much better today. Pathology returned active chronic colitis, moderate 

to marked, without granulomas or specific features confirming ulcerative 

colitis.





Problem list:


Ulcerative colitis - newly diagnosed


Hx "colitis" 10 yrs ago - no scopes, manifested as the same, not on any home 

meds


Bloody stools, heme occult positive


Recent arthralgias - was taking ibuprofen 3 pills BID sometimes empty stomach


Sever protein calorie malnutrition - albumin 1.6, dietician to see


Elevated INR


Anemia - acute GI blood loss anemia





PLAN:


Can d/c cipro/flagyl


Follow GI recs


I provided him copy of his CT and explained the findings


He denies fam hx IBD


Mesalamine 4 times daily, pred 20mg BID, f/u with GI





Greater than 30 minutes spent on d/c





Discharge Information


Condition at Discharge:  Improved


Follow Up:  Weeks (1)


Disposition/Orders:  D/C to Home


Scheduled


Lactobacillus Rhamnosus Gg (Culturelle) 1 Each Cap.sprink, 1 CAP PO BID for 

Colitis for 30 Days, #60


   Prescribed by: MANJINDER WOOTEN MD on 11/12/19 1401


Pantoprazole Sodium (Protonix  **) 40 Mg Tablet.dr, 40 MG PO DAILYAC for GERD 

for 30 Days, #30


   Prescribed by: MANJINDER WOOTEN MD on 11/12/19 1401


Prednisone (Prednisone) 20 Mg Tablet, 20 MG PO BID for Ulcerative Colitis for 30

Days, #60


   Prescribed by: MANJINDER WOOTEN MD on 11/12/19 1401


Sulfasalazine (Azulfidine) 500 Mg Tablet, 500 MG PO QID for Ulcerative Colitis 

for 30 Days, #120 Ref 2


   Prescribed by: MANJINDER WOOTEN MD on 11/12/19 1401











MANJINDER WOOTEN MD        Nov 13, 2019 16:35